# Patient Record
Sex: FEMALE | Race: WHITE | NOT HISPANIC OR LATINO | URBAN - METROPOLITAN AREA
[De-identification: names, ages, dates, MRNs, and addresses within clinical notes are randomized per-mention and may not be internally consistent; named-entity substitution may affect disease eponyms.]

---

## 2023-03-21 ENCOUNTER — NURSING HOME VISIT (OUTPATIENT)
Dept: POST ACUTE CARE | Facility: EXTERNAL LOCATION | Age: 73
End: 2023-03-21
Payer: MEDICARE

## 2023-03-21 DIAGNOSIS — F10.11 HISTORY OF ALCOHOL ABUSE: ICD-10-CM

## 2023-03-21 DIAGNOSIS — F03.C0 SEVERE DEMENTIA, UNSPECIFIED DEMENTIA TYPE, UNSPECIFIED WHETHER BEHAVIORAL, PSYCHOTIC, OR MOOD DISTURBANCE OR ANXIETY (MULTI): Primary | ICD-10-CM

## 2023-03-21 DIAGNOSIS — F41.9 ANXIETY AND DEPRESSION: ICD-10-CM

## 2023-03-21 DIAGNOSIS — F32.A ANXIETY AND DEPRESSION: ICD-10-CM

## 2023-03-21 DIAGNOSIS — C44.81 BASAL CELL CARCINOMA (BCC) OF OVERLAPPING SITES OF SKIN: ICD-10-CM

## 2023-03-21 PROCEDURE — 99497 ADVNCD CARE PLAN 30 MIN: CPT | Performed by: EMERGENCY MEDICINE

## 2023-03-21 PROCEDURE — 99342 HOME/RES VST NEW LOW MDM 30: CPT | Performed by: EMERGENCY MEDICINE

## 2023-03-21 NOTE — LETTER
Patient: Karolina Rice  : 1950    Encounter Date: 2023    Karolina Rice   72 y.o.  female  @location@            Assessment and Plan:  History and physical    Advanced dementia  History of alcohol abuse in the past  Anxiety and depression  Basal cell carcinoma of multiple sites   Allergic rhinitis     Currently her mental issues are controlled with Seroquel 12.5 mg orally every 8 hours as needed   Haldol 2 mg as needed   Takes melatonin 6 mg at bed time to help her sleep     Provide safe environment for the patient    Continue current medication regimen    Fall prevention    OT PT and speech therapy    Monitor and treat blood pressure    Skin care and aggressive decubitus ulcer prevention.    Bowel and bladder care    Optimal nutrition and supplementation    Monitor and treat blood glucose    GI  and DVT prophylaxis    Symptom control with as needed medications    Periodic lab work    Will follow    I spent greater than 16 minutes discussing advanced care planning including the explanation and discussion of advanced directives. If patient does not have current up to date documents, examples and information provided on how to create both living will and power of . Patient was encouraged to work on completing these documents.  Information and advise was also provided on DO NOT RESUSCITATE and patient encouraged to consider this  Patient is not sure about DNR at this time      source of history: Nurse, Medical personnel, Medical record, Patient.  History limitation: None.    HPI:  History and physical    Patient is unable to give any detailed history and therefore history is obtained from the chart  No acute complaints voiced by the patient or acute concerns raised by nursing  No current outpatient medications on file.     No current facility-administered medications for this visit.   Patient used to reside by herself in her home.  She was found wandering about couple of times  She was seen by  psychiatry evaluated to be recommended that she be placed in a memory care unit      Past history taken from chart includes  Basal cell carcinoma of the shoulder and skin of the forehead  Allergic rhinitis  Anxiety and depression      Past surgical history-  Excision of neuroma from digits of the toes  Hernia repair surgery  Ovarian cystectomy  Liposuction  Shoulder surgery    Social history-  Ex-smoker-15-pack-year history approximately  Used to drink 10 drinks a week of alcohol.  Unclear as to when she stopped drinking  No visits with results within 2 Month(s) from this visit.   Latest known visit with results is:   No results found for any previous visit.       Physical Exam:  Vital signs as per nursing/MA documentation were reviewed  General appearance: Alert and in no acute distress  HEENT: Normal Inspection  Neck - Normal Inspection  Respiratory : No respiratory distress. Lungs are clear   Cardiovascular: heart rate normal. No gallop  Back - normal inspection  Skin inspection:Warm  Musculoskeletal : No deformities  Neuro : Limited exam. Baseline    ROS: Review of symptoms is negative except for what is mentioned in HPI    No results found.    No family history on file.    Social History     Socioeconomic History   • Marital status:      Spouse name: Not on file   • Number of children: Not on file   • Years of education: Not on file   • Highest education level: Not on file   Occupational History   • Not on file   Tobacco Use   • Smoking status: Not on file   • Smokeless tobacco: Not on file   Vaping Use   • Vaping status: Not on file   Substance and Sexual Activity   • Alcohol use: Not on file   • Drug use: Not on file   • Sexual activity: Not on file   Other Topics Concern   • Not on file   Social History Narrative   • Not on file     Social Determinants of Health     Financial Resource Strain: Not on file   Food Insecurity: Not on file   Transportation Needs: Not on file   Physical Activity: Not on  file   Stress: Not on file   Social Connections: Not on file   Intimate Partner Violence: Not on file   Housing Stability: Not on file       Past Medical History:   Diagnosis Date   • Personal history of other malignant neoplasm of skin 01/10/2017    History of basal cell carcinoma       Past Surgical History:   Procedure Laterality Date   • COLONOSCOPY  01/10/2017    Complete Colonoscopy   • HERNIA REPAIR  01/10/2017    Hernia Repair   • OTHER SURGICAL HISTORY  01/10/2017    Excision Of Neuroma   • OTHER SURGICAL HISTORY  01/10/2017    Ovarian Cystectomy   • SHOULDER SURGERY  01/10/2017    Shoulder Surgery   • SKIN CANCER EXCISION  01/10/2017    Mohs Micrographic Surgery Face   • TOTAL KNEE ARTHROPLASTY  01/10/2017    Total Knee Arthroplasty         Charting was completed using voice recognition technology and may include unintended errors.    Discussed with patient/family, RN, and NP.      Electronically Signed By: Edmar Louise MD   4/4/23  4:43 PM

## 2023-04-04 PROBLEM — F41.9 ANXIETY AND DEPRESSION: Status: ACTIVE | Noted: 2023-04-04

## 2023-04-04 PROBLEM — C44.81 BASAL CELL CARCINOMA (BCC) OF OVERLAPPING SITES OF SKIN: Status: ACTIVE | Noted: 2023-04-04

## 2023-04-04 PROBLEM — F03.C0 SEVERE DEMENTIA (MULTI): Status: ACTIVE | Noted: 2023-04-04

## 2023-04-04 PROBLEM — F32.A ANXIETY AND DEPRESSION: Status: ACTIVE | Noted: 2023-04-04

## 2023-04-04 PROBLEM — F10.11 HISTORY OF ALCOHOL ABUSE: Status: ACTIVE | Noted: 2023-04-04

## 2023-04-04 NOTE — PROGRESS NOTES
Karolina Rice   72 y.o.  female  @location@            Assessment and Plan:  History and physical    Advanced dementia  History of alcohol abuse in the past  Anxiety and depression  Basal cell carcinoma of multiple sites   Allergic rhinitis     Currently her mental issues are controlled with Seroquel 12.5 mg orally every 8 hours as needed   Haldol 2 mg as needed   Takes melatonin 6 mg at bed time to help her sleep     Provide safe environment for the patient    Continue current medication regimen    Fall prevention    OT PT and speech therapy    Monitor and treat blood pressure    Skin care and aggressive decubitus ulcer prevention.    Bowel and bladder care    Optimal nutrition and supplementation    Monitor and treat blood glucose    GI  and DVT prophylaxis    Symptom control with as needed medications    Periodic lab work    Will follow    I spent greater than 16 minutes discussing advanced care planning including the explanation and discussion of advanced directives. If patient does not have current up to date documents, examples and information provided on how to create both living will and power of . Patient was encouraged to work on completing these documents.  Information and advise was also provided on DO NOT RESUSCITATE and patient encouraged to consider this  Patient is not sure about DNR at this time      source of history: Nurse, Medical personnel, Medical record, Patient.  History limitation: None.    HPI:  History and physical    Patient is unable to give any detailed history and therefore history is obtained from the chart  No acute complaints voiced by the patient or acute concerns raised by nursing  No current outpatient medications on file.     No current facility-administered medications for this visit.   Patient used to reside by herself in her home.  She was found wandering about couple of times  She was seen by psychiatry evaluated to be recommended that she be placed in a memory  care unit      Past history taken from chart includes  Basal cell carcinoma of the shoulder and skin of the forehead  Allergic rhinitis  Anxiety and depression      Past surgical history-  Excision of neuroma from digits of the toes  Hernia repair surgery  Ovarian cystectomy  Liposuction  Shoulder surgery    Social history-  Ex-smoker-15-pack-year history approximately  Used to drink 10 drinks a week of alcohol.  Unclear as to when she stopped drinking  No visits with results within 2 Month(s) from this visit.   Latest known visit with results is:   No results found for any previous visit.       Physical Exam:  Vital signs as per nursing/MA documentation were reviewed  General appearance: Alert and in no acute distress  HEENT: Normal Inspection  Neck - Normal Inspection  Respiratory : No respiratory distress. Lungs are clear   Cardiovascular: heart rate normal. No gallop  Back - normal inspection  Skin inspection:Warm  Musculoskeletal : No deformities  Neuro : Limited exam. Baseline    ROS: Review of symptoms is negative except for what is mentioned in HPI    No results found.    No family history on file.    Social History     Socioeconomic History    Marital status:      Spouse name: Not on file    Number of children: Not on file    Years of education: Not on file    Highest education level: Not on file   Occupational History    Not on file   Tobacco Use    Smoking status: Not on file    Smokeless tobacco: Not on file   Vaping Use    Vaping status: Not on file   Substance and Sexual Activity    Alcohol use: Not on file    Drug use: Not on file    Sexual activity: Not on file   Other Topics Concern    Not on file   Social History Narrative    Not on file     Social Determinants of Health     Financial Resource Strain: Not on file   Food Insecurity: Not on file   Transportation Needs: Not on file   Physical Activity: Not on file   Stress: Not on file   Social Connections: Not on file   Intimate Partner  Violence: Not on file   Housing Stability: Not on file       Past Medical History:   Diagnosis Date    Personal history of other malignant neoplasm of skin 01/10/2017    History of basal cell carcinoma       Past Surgical History:   Procedure Laterality Date    COLONOSCOPY  01/10/2017    Complete Colonoscopy    HERNIA REPAIR  01/10/2017    Hernia Repair    OTHER SURGICAL HISTORY  01/10/2017    Excision Of Neuroma    OTHER SURGICAL HISTORY  01/10/2017    Ovarian Cystectomy    SHOULDER SURGERY  01/10/2017    Shoulder Surgery    SKIN CANCER EXCISION  01/10/2017    Mohs Micrographic Surgery Face    TOTAL KNEE ARTHROPLASTY  01/10/2017    Total Knee Arthroplasty         Charting was completed using voice recognition technology and may include unintended errors.    Discussed with patient/family, RN, and NP.

## 2023-04-18 ENCOUNTER — NURSING HOME VISIT (OUTPATIENT)
Dept: POST ACUTE CARE | Facility: EXTERNAL LOCATION | Age: 73
End: 2023-04-18
Payer: MEDICARE

## 2023-04-18 DIAGNOSIS — F02.C0 SEVERE DEMENTIA ASSOCIATED WITH OTHER UNDERLYING DISEASE, UNSPECIFIED WHETHER BEHAVIORAL, PSYCHOTIC, OR MOOD DISTURBANCE OR ANXIETY (MULTI): Primary | ICD-10-CM

## 2023-04-18 DIAGNOSIS — C44.81 BASAL CELL CARCINOMA (BCC) OF OVERLAPPING SITES OF SKIN: ICD-10-CM

## 2023-04-18 DIAGNOSIS — F32.4 MAJOR DEPRESSIVE DISORDER IN PARTIAL REMISSION, UNSPECIFIED WHETHER RECURRENT (CMS-HCC): ICD-10-CM

## 2023-04-18 DIAGNOSIS — F03.918 AGGRESSIVE BEHAVIOR DUE TO DEMENTIA (MULTI): ICD-10-CM

## 2023-04-18 PROCEDURE — 99349 HOME/RES VST EST MOD MDM 40: CPT | Performed by: EMERGENCY MEDICINE

## 2023-04-18 NOTE — LETTER
Patient: Karolina Rice  : 1950    Encounter Date: 2023    Karolina Rice   72 y.o.  female  @location@            Assessment and Plan:  Advanced dementia  History of alcohol abuse in the past  Anxiety and depression  Basal cell carcinoma of multiple sites   Allergic rhinitis     Currently her mental issues are controlled with Seroquel 12.5 mg orally every 8 hours as needed   Haldol 2 mg as needed   Takes melatonin 6 mg at bed time to help her sleep     Provide safe environment for the patient    Continue current medication regimen    Fall prevention    OT PT and speech therapy    Monitor and treat blood pressure    Skin care and aggressive decubitus ulcer prevention.    Bowel and bladder care    Optimal nutrition and supplementation    Monitor and treat blood glucose    GI  and DVT prophylaxis    Symptom control with as needed medications    Periodic lab work    Will follow    -Fall prevention    -Cognitive engagement     -Monitor and treat blood pressure     -Aggressive decubitus ulcer prevention.     -Bowel and bladder care     -Optimal nutrition and supplementation as needed     -GI  and DVT prophylaxis     -Symptom control     -Ambulation as tolerated     -Will follow    source of history: Nurse, Medical personnel, Medical record, Patient.  History limitation: None.    HPI:    Patient is unable to give any detailed history and therefore history is obtained from the chart  No acute complaints voiced by the patient or acute concerns raised by nursing  No current outpatient medications on file.     No current facility-administered medications for this visit.   Patient used to reside by herself in her home.  She was found wandering about couple of times  She was seen by psychiatry evaluated to be recommended that she be placed in a memory care unit      Past history taken from chart includes  Basal cell carcinoma of the shoulder and skin of the forehead  Allergic rhinitis  Anxiety and  depression      Past surgical history-  Excision of neuroma from digits of the toes  Hernia repair surgery  Ovarian cystectomy  Liposuction  Shoulder surgery    Social history-  Ex-smoker-15-pack-year history approximately  Used to drink 10 drinks a week of alcohol.  Unclear as to when she stopped drinking  No visits with results within 2 Month(s) from this visit.   Latest known visit with results is:   No results found for any previous visit.       Physical Exam:  Vital signs as per nursing/MA documentation were reviewed  General appearance: Alert and in no acute distress  HEENT: Normal Inspection  Neck - Normal Inspection  Respiratory : No respiratory distress. Lungs are clear   Cardiovascular: heart rate normal. No gallop  Back - normal inspection  Skin inspection:Warm  Musculoskeletal : No deformities  Neuro : Limited exam. Baseline    ROS: Review of symptoms is negative except for what is mentioned in HPI    No results found.    No family history on file.    Social History     Socioeconomic History   • Marital status:      Spouse name: Not on file   • Number of children: Not on file   • Years of education: Not on file   • Highest education level: Not on file   Occupational History   • Not on file   Tobacco Use   • Smoking status: Not on file   • Smokeless tobacco: Not on file   Vaping Use   • Vaping status: Not on file   Substance and Sexual Activity   • Alcohol use: Not on file   • Drug use: Not on file   • Sexual activity: Not on file   Other Topics Concern   • Not on file   Social History Narrative   • Not on file     Social Determinants of Health     Financial Resource Strain: Not on file   Food Insecurity: Not on file   Transportation Needs: Not on file   Physical Activity: Not on file   Stress: Not on file   Social Connections: Not on file   Intimate Partner Violence: Not on file   Housing Stability: Not on file       Past Medical History:   Diagnosis Date   • Personal history of other malignant  neoplasm of skin 01/10/2017    History of basal cell carcinoma       Past Surgical History:   Procedure Laterality Date   • COLONOSCOPY  01/10/2017    Complete Colonoscopy   • HERNIA REPAIR  01/10/2017    Hernia Repair   • OTHER SURGICAL HISTORY  01/10/2017    Excision Of Neuroma   • OTHER SURGICAL HISTORY  01/10/2017    Ovarian Cystectomy   • SHOULDER SURGERY  01/10/2017    Shoulder Surgery   • SKIN CANCER EXCISION  01/10/2017    Mohs Micrographic Surgery Face   • TOTAL KNEE ARTHROPLASTY  01/10/2017    Total Knee Arthroplasty         Charting was completed using voice recognition technology and may include unintended errors.    Discussed with patient/family, RN, and NP.      Electronically Signed By: Edmar Louise MD   4/22/23  8:02 AM

## 2023-04-22 PROBLEM — F03.918 AGGRESSIVE BEHAVIOR DUE TO DEMENTIA (MULTI): Status: ACTIVE | Noted: 2023-04-22

## 2023-04-22 PROBLEM — F32.4 MAJOR DEPRESSIVE DISORDER IN PARTIAL REMISSION (CMS-HCC): Status: ACTIVE | Noted: 2023-04-22

## 2023-04-22 NOTE — PROGRESS NOTES
Karolina Rice   72 y.o.  female  @location@            Assessment and Plan:  Advanced dementia  History of alcohol abuse in the past  Anxiety and depression  Basal cell carcinoma of multiple sites   Allergic rhinitis     Currently her mental issues are controlled with Seroquel 12.5 mg orally every 8 hours as needed   Haldol 2 mg as needed   Takes melatonin 6 mg at bed time to help her sleep     Provide safe environment for the patient    Continue current medication regimen    Fall prevention    OT PT and speech therapy    Monitor and treat blood pressure    Skin care and aggressive decubitus ulcer prevention.    Bowel and bladder care    Optimal nutrition and supplementation    Monitor and treat blood glucose    GI  and DVT prophylaxis    Symptom control with as needed medications    Periodic lab work    Will follow    -Fall prevention    -Cognitive engagement     -Monitor and treat blood pressure     -Aggressive decubitus ulcer prevention.     -Bowel and bladder care     -Optimal nutrition and supplementation as needed     -GI  and DVT prophylaxis     -Symptom control     -Ambulation as tolerated     -Will follow    source of history: Nurse, Medical personnel, Medical record, Patient.  History limitation: None.    HPI:    Patient is unable to give any detailed history and therefore history is obtained from the chart  No acute complaints voiced by the patient or acute concerns raised by nursing  No current outpatient medications on file.     No current facility-administered medications for this visit.   Patient used to reside by herself in her home.  She was found wandering about couple of times  She was seen by psychiatry evaluated to be recommended that she be placed in a memory care unit      Past history taken from chart includes  Basal cell carcinoma of the shoulder and skin of the forehead  Allergic rhinitis  Anxiety and depression      Past surgical history-  Excision of neuroma from digits of the  toes  Hernia repair surgery  Ovarian cystectomy  Liposuction  Shoulder surgery    Social history-  Ex-smoker-15-pack-year history approximately  Used to drink 10 drinks a week of alcohol.  Unclear as to when she stopped drinking  No visits with results within 2 Month(s) from this visit.   Latest known visit with results is:   No results found for any previous visit.       Physical Exam:  Vital signs as per nursing/MA documentation were reviewed  General appearance: Alert and in no acute distress  HEENT: Normal Inspection  Neck - Normal Inspection  Respiratory : No respiratory distress. Lungs are clear   Cardiovascular: heart rate normal. No gallop  Back - normal inspection  Skin inspection:Warm  Musculoskeletal : No deformities  Neuro : Limited exam. Baseline    ROS: Review of symptoms is negative except for what is mentioned in HPI    No results found.    No family history on file.    Social History     Socioeconomic History    Marital status:      Spouse name: Not on file    Number of children: Not on file    Years of education: Not on file    Highest education level: Not on file   Occupational History    Not on file   Tobacco Use    Smoking status: Not on file    Smokeless tobacco: Not on file   Vaping Use    Vaping status: Not on file   Substance and Sexual Activity    Alcohol use: Not on file    Drug use: Not on file    Sexual activity: Not on file   Other Topics Concern    Not on file   Social History Narrative    Not on file     Social Determinants of Health     Financial Resource Strain: Not on file   Food Insecurity: Not on file   Transportation Needs: Not on file   Physical Activity: Not on file   Stress: Not on file   Social Connections: Not on file   Intimate Partner Violence: Not on file   Housing Stability: Not on file       Past Medical History:   Diagnosis Date    Personal history of other malignant neoplasm of skin 01/10/2017    History of basal cell carcinoma       Past Surgical History:    Procedure Laterality Date    COLONOSCOPY  01/10/2017    Complete Colonoscopy    HERNIA REPAIR  01/10/2017    Hernia Repair    OTHER SURGICAL HISTORY  01/10/2017    Excision Of Neuroma    OTHER SURGICAL HISTORY  01/10/2017    Ovarian Cystectomy    SHOULDER SURGERY  01/10/2017    Shoulder Surgery    SKIN CANCER EXCISION  01/10/2017    Mohs Micrographic Surgery Face    TOTAL KNEE ARTHROPLASTY  01/10/2017    Total Knee Arthroplasty         Charting was completed using voice recognition technology and may include unintended errors.    Discussed with patient/family, RN, and NP.

## 2023-05-09 ENCOUNTER — NURSING HOME VISIT (OUTPATIENT)
Dept: POST ACUTE CARE | Facility: EXTERNAL LOCATION | Age: 73
End: 2023-05-09
Payer: MEDICARE

## 2023-05-09 DIAGNOSIS — F03.918 AGGRESSIVE BEHAVIOR DUE TO DEMENTIA (MULTI): Primary | ICD-10-CM

## 2023-05-09 DIAGNOSIS — F32.4 MAJOR DEPRESSIVE DISORDER IN PARTIAL REMISSION, UNSPECIFIED WHETHER RECURRENT (CMS-HCC): ICD-10-CM

## 2023-05-09 DIAGNOSIS — C44.81 BASAL CELL CARCINOMA (BCC) OF OVERLAPPING SITES OF SKIN: ICD-10-CM

## 2023-05-09 DIAGNOSIS — F02.C0 SEVERE DEMENTIA ASSOCIATED WITH OTHER UNDERLYING DISEASE, UNSPECIFIED WHETHER BEHAVIORAL, PSYCHOTIC, OR MOOD DISTURBANCE OR ANXIETY (MULTI): ICD-10-CM

## 2023-05-09 PROCEDURE — 99349 HOME/RES VST EST MOD MDM 40: CPT | Performed by: EMERGENCY MEDICINE

## 2023-05-09 NOTE — LETTER
Patient: Karolina Rice  : 1950    Encounter Date: 2023    Karolina Rice   72 y.o.  female  @location@            Assessment and Plan:  Advanced dementia  History of alcohol abuse in the past  Anxiety and depression  Basal cell carcinoma of multiple sites   Allergic rhinitis     Currently her mental issues are controlled with Seroquel 12.5 mg orally every 8 hours as needed   Haldol 2 mg as needed   Takes melatonin 6 mg at bed time to help her sleep     Provide safe environment for the patient    Continue current medication regimen    Fall prevention    OT PT and speech therapy    Monitor and treat blood pressure    Skin care and aggressive decubitus ulcer prevention.    Bowel and bladder care    Optimal nutrition and supplementation    Monitor and treat blood glucose    GI  and DVT prophylaxis    Symptom control with as needed medications    Periodic lab work    Will follow    1. medications are reviewed      2. Continue with rehabilitative, supportive, and or restorative care as ordered and as the patient tolerates     3. Laboratory evaluations will be monitored on an ongoing as needed basis     4. Medications have been cross-referenced with the patient's diagnoses list, and medications reductions have been considered and/or implemented.     5. Pharmacy recommendations are addressed on an ongoing as needed basis.     6. Controlled substances have been electronically scripted every 60 days for opiates and others of similar schedule, and every 6 months for sedative/hypnotics and others of similar schedule.     7. Nursing has been queried about any potential adverse events that need to be reported to me.    Salient information and adjustment of care plan pertaining to this individual patient interaction today are the following:      A. We will continue with restorative and supportive care as the patient tolerates    B. Laboratory examinations will continue to be drawn on an ongoing as-needed basis. The  patient's weight needs to be monitored and if needed we may need to institute appetite stimulating medication    C. The patient's long term prognosis is guarded    Charting is done using voice recognition software and may contain errors which may not have been completely corrected      source of history: Nurse, Medical personnel, Medical record, Patient.  History limitation: None.    HPI:    Patient is unable to give any detailed history and therefore history is obtained from the chart  No acute complaints voiced by the patient or acute concerns raised by nursing  No current outpatient medications on file.     No current facility-administered medications for this visit.   Patient used to reside by herself in her home.  She was found wandering about couple of times  She was seen by psychiatry evaluated to be recommended that she be placed in a memory care unit      Past history taken from chart includes  Basal cell carcinoma of the shoulder and skin of the forehead  Allergic rhinitis  Anxiety and depression      Past surgical history-  Excision of neuroma from digits of the toes  Hernia repair surgery  Ovarian cystectomy  Liposuction  Shoulder surgery    Social history-  Ex-smoker-15-pack-year history approximately  Used to drink 10 drinks a week of alcohol.  Unclear as to when she stopped drinking  No visits with results within 2 Month(s) from this visit.   Latest known visit with results is:   No results found for any previous visit.       Physical Exam:  Vital signs as per nursing/MA documentation were reviewed  General appearance: Alert and in no acute distress  HEENT: Normal Inspection  Neck - Normal Inspection  Respiratory : No respiratory distress. Lungs are clear   Cardiovascular: heart rate normal. No gallop  Back - normal inspection  Skin inspection:Warm  Musculoskeletal : No deformities  Neuro : Limited exam. Baseline    ROS: Review of symptoms is negative except for what is mentioned in HPI    No results  found.    No family history on file.    Social History     Socioeconomic History   • Marital status:      Spouse name: Not on file   • Number of children: Not on file   • Years of education: Not on file   • Highest education level: Not on file   Occupational History   • Not on file   Tobacco Use   • Smoking status: Not on file   • Smokeless tobacco: Not on file   Vaping Use   • Vaping status: Not on file   Substance and Sexual Activity   • Alcohol use: Not on file   • Drug use: Not on file   • Sexual activity: Not on file   Other Topics Concern   • Not on file   Social History Narrative   • Not on file     Social Determinants of Health     Financial Resource Strain: Not on file   Food Insecurity: Not on file   Transportation Needs: Not on file   Physical Activity: Not on file   Stress: Not on file   Social Connections: Not on file   Intimate Partner Violence: Not on file   Housing Stability: Not on file       Past Medical History:   Diagnosis Date   • Personal history of other malignant neoplasm of skin 01/10/2017    History of basal cell carcinoma       Past Surgical History:   Procedure Laterality Date   • COLONOSCOPY  01/10/2017    Complete Colonoscopy   • HERNIA REPAIR  01/10/2017    Hernia Repair   • OTHER SURGICAL HISTORY  01/10/2017    Excision Of Neuroma   • OTHER SURGICAL HISTORY  01/10/2017    Ovarian Cystectomy   • SHOULDER SURGERY  01/10/2017    Shoulder Surgery   • SKIN CANCER EXCISION  01/10/2017    Mohs Micrographic Surgery Face   • TOTAL KNEE ARTHROPLASTY  01/10/2017    Total Knee Arthroplasty         Charting was completed using voice recognition technology and may include unintended errors.    Discussed with patient/family, RN, and NP.      Electronically Signed By: Edmar Louise MD   5/13/23  9:14 AM

## 2023-05-13 NOTE — PROGRESS NOTES
Karolina Rice   72 y.o.  female  @location@            Assessment and Plan:  Advanced dementia  History of alcohol abuse in the past  Anxiety and depression  Basal cell carcinoma of multiple sites   Allergic rhinitis     Currently her mental issues are controlled with Seroquel 12.5 mg orally every 8 hours as needed   Haldol 2 mg as needed   Takes melatonin 6 mg at bed time to help her sleep     Provide safe environment for the patient    Continue current medication regimen    Fall prevention    OT PT and speech therapy    Monitor and treat blood pressure    Skin care and aggressive decubitus ulcer prevention.    Bowel and bladder care    Optimal nutrition and supplementation    Monitor and treat blood glucose    GI  and DVT prophylaxis    Symptom control with as needed medications    Periodic lab work    Will follow    1. medications are reviewed      2. Continue with rehabilitative, supportive, and or restorative care as ordered and as the patient tolerates     3. Laboratory evaluations will be monitored on an ongoing as needed basis     4. Medications have been cross-referenced with the patient's diagnoses list, and medications reductions have been considered and/or implemented.     5. Pharmacy recommendations are addressed on an ongoing as needed basis.     6. Controlled substances have been electronically scripted every 60 days for opiates and others of similar schedule, and every 6 months for sedative/hypnotics and others of similar schedule.     7. Nursing has been queried about any potential adverse events that need to be reported to me.    Salient information and adjustment of care plan pertaining to this individual patient interaction today are the following:      A. We will continue with restorative and supportive care as the patient tolerates    B. Laboratory examinations will continue to be drawn on an ongoing as-needed basis. The patient's weight needs to be monitored and if needed we may need to  institute appetite stimulating medication    C. The patient's long term prognosis is guarded    Charting is done using voice recognition software and may contain errors which may not have been completely corrected      source of history: Nurse, Medical personnel, Medical record, Patient.  History limitation: None.    HPI:    Patient is unable to give any detailed history and therefore history is obtained from the chart  No acute complaints voiced by the patient or acute concerns raised by nursing  No current outpatient medications on file.     No current facility-administered medications for this visit.   Patient used to reside by herself in her home.  She was found wandering about couple of times  She was seen by psychiatry evaluated to be recommended that she be placed in a memory care unit      Past history taken from chart includes  Basal cell carcinoma of the shoulder and skin of the forehead  Allergic rhinitis  Anxiety and depression      Past surgical history-  Excision of neuroma from digits of the toes  Hernia repair surgery  Ovarian cystectomy  Liposuction  Shoulder surgery    Social history-  Ex-smoker-15-pack-year history approximately  Used to drink 10 drinks a week of alcohol.  Unclear as to when she stopped drinking  No visits with results within 2 Month(s) from this visit.   Latest known visit with results is:   No results found for any previous visit.       Physical Exam:  Vital signs as per nursing/MA documentation were reviewed  General appearance: Alert and in no acute distress  HEENT: Normal Inspection  Neck - Normal Inspection  Respiratory : No respiratory distress. Lungs are clear   Cardiovascular: heart rate normal. No gallop  Back - normal inspection  Skin inspection:Warm  Musculoskeletal : No deformities  Neuro : Limited exam. Baseline    ROS: Review of symptoms is negative except for what is mentioned in HPI    No results found.    No family history on file.    Social History      Socioeconomic History    Marital status:      Spouse name: Not on file    Number of children: Not on file    Years of education: Not on file    Highest education level: Not on file   Occupational History    Not on file   Tobacco Use    Smoking status: Not on file    Smokeless tobacco: Not on file   Vaping Use    Vaping status: Not on file   Substance and Sexual Activity    Alcohol use: Not on file    Drug use: Not on file    Sexual activity: Not on file   Other Topics Concern    Not on file   Social History Narrative    Not on file     Social Determinants of Health     Financial Resource Strain: Not on file   Food Insecurity: Not on file   Transportation Needs: Not on file   Physical Activity: Not on file   Stress: Not on file   Social Connections: Not on file   Intimate Partner Violence: Not on file   Housing Stability: Not on file       Past Medical History:   Diagnosis Date    Personal history of other malignant neoplasm of skin 01/10/2017    History of basal cell carcinoma       Past Surgical History:   Procedure Laterality Date    COLONOSCOPY  01/10/2017    Complete Colonoscopy    HERNIA REPAIR  01/10/2017    Hernia Repair    OTHER SURGICAL HISTORY  01/10/2017    Excision Of Neuroma    OTHER SURGICAL HISTORY  01/10/2017    Ovarian Cystectomy    SHOULDER SURGERY  01/10/2017    Shoulder Surgery    SKIN CANCER EXCISION  01/10/2017    Mohs Micrographic Surgery Face    TOTAL KNEE ARTHROPLASTY  01/10/2017    Total Knee Arthroplasty         Charting was completed using voice recognition technology and may include unintended errors.    Discussed with patient/family, RN, and NP.

## 2023-06-22 ENCOUNTER — HOME VISIT (OUTPATIENT)
Dept: POST ACUTE CARE | Facility: EXTERNAL LOCATION | Age: 73
End: 2023-06-22
Payer: MEDICARE

## 2023-06-22 DIAGNOSIS — C44.81 BASAL CELL CARCINOMA (BCC) OF OVERLAPPING SITES OF SKIN: Primary | ICD-10-CM

## 2023-06-22 DIAGNOSIS — F03.918 AGGRESSIVE BEHAVIOR DUE TO DEMENTIA (MULTI): ICD-10-CM

## 2023-06-22 DIAGNOSIS — F32.4 MAJOR DEPRESSIVE DISORDER IN PARTIAL REMISSION, UNSPECIFIED WHETHER RECURRENT (CMS-HCC): ICD-10-CM

## 2023-06-22 DIAGNOSIS — F10.11 HISTORY OF ALCOHOL ABUSE: ICD-10-CM

## 2023-06-22 PROCEDURE — 99348 HOME/RES VST EST LOW MDM 30: CPT | Performed by: EMERGENCY MEDICINE

## 2023-07-03 NOTE — PROGRESS NOTES
Karolina Rice   72 y.o.  female  @location@            Assessment and Plan:  Advanced dementia  History of alcohol abuse in the past  Anxiety and depression  Basal cell carcinoma of multiple sites   Allergic rhinitis     Currently her mental issues are controlled with Seroquel 12.5 mg orally every 8 hours as needed   Haldol 2 mg as needed   Takes melatonin 6 mg at bed time to help her sleep     -Fall prevention    -Cognitive engagement     -Monitor and treat blood pressure     -Aggressive decubitus ulcer prevention.     -Bowel and bladder care     -Optimal nutrition and supplementation as needed     -GI  and DVT prophylaxis     -Symptom control     -Ambulation as tolerated     -Will follow    Charting is done using voice recognition software and may contain errors which have not been completely corrected      source of history: Nurse, Medical personnel, Medical record, Patient.  History limitation: None.    HPI:    Patient is unable to give any detailed history and therefore history is obtained from the chart  No acute complaints voiced by the patient or acute concerns raised by nursing    Patient used to reside by herself in her home.  She was found wandering about couple of times  She was seen by psychiatry evaluated to be recommended that she be placed in a memory care unit      Past history taken from chart includes  Basal cell carcinoma of the shoulder and skin of the forehead  Allergic rhinitis  Anxiety and depression      Past surgical history-  Excision of neuroma from digits of the toes  Hernia repair surgery  Ovarian cystectomy  Liposuction  Shoulder surgery    Social history-  Ex-smoker-15-pack-year history approximately  Used to drink 10 drinks a week of alcohol.  Unclear as to when she stopped drinking      Physical Exam:  Vital signs as per nursing/MA documentation were reviewed  General appearance: Alert and in no acute distress  HEENT: Normal Inspection  Neck - Normal Inspection  Respiratory : No  respiratory distress. Lungs are clear   Cardiovascular: heart rate normal. No gallop  Back - normal inspection  Skin inspection:Warm  Musculoskeletal : No deformities  Neuro : Limited exam. Baseline    ROS: Review of symptoms is negative except for what is mentioned in HPI          Past Medical History:   Diagnosis Date    Personal history of other malignant neoplasm of skin 01/10/2017    History of basal cell carcinoma       Past Surgical History:   Procedure Laterality Date    COLONOSCOPY  01/10/2017    Complete Colonoscopy    HERNIA REPAIR  01/10/2017    Hernia Repair    OTHER SURGICAL HISTORY  01/10/2017    Excision Of Neuroma    OTHER SURGICAL HISTORY  01/10/2017    Ovarian Cystectomy    SHOULDER SURGERY  01/10/2017    Shoulder Surgery    SKIN CANCER EXCISION  01/10/2017    Mohs Micrographic Surgery Face    TOTAL KNEE ARTHROPLASTY  01/10/2017    Total Knee Arthroplasty         Charting was completed using voice recognition technology and may include unintended errors.    Discussed with patient/family, RN, and NP.

## 2023-07-18 ENCOUNTER — HOME VISIT (OUTPATIENT)
Dept: POST ACUTE CARE | Facility: EXTERNAL LOCATION | Age: 73
End: 2023-07-18
Payer: MEDICARE

## 2023-07-18 DIAGNOSIS — F32.4 MAJOR DEPRESSIVE DISORDER IN PARTIAL REMISSION, UNSPECIFIED WHETHER RECURRENT (CMS-HCC): ICD-10-CM

## 2023-07-18 DIAGNOSIS — C44.81 BASAL CELL CARCINOMA (BCC) OF OVERLAPPING SITES OF SKIN: ICD-10-CM

## 2023-07-18 DIAGNOSIS — F32.A ANXIETY AND DEPRESSION: Primary | ICD-10-CM

## 2023-07-18 DIAGNOSIS — F02.C0 SEVERE DEMENTIA ASSOCIATED WITH OTHER UNDERLYING DISEASE, UNSPECIFIED WHETHER BEHAVIORAL, PSYCHOTIC, OR MOOD DISTURBANCE OR ANXIETY (MULTI): ICD-10-CM

## 2023-07-18 DIAGNOSIS — F41.9 ANXIETY AND DEPRESSION: Primary | ICD-10-CM

## 2023-07-18 PROCEDURE — 99349 HOME/RES VST EST MOD MDM 40: CPT | Performed by: EMERGENCY MEDICINE

## 2023-07-25 NOTE — PROGRESS NOTES
Karolina Rice   72 y.o.  female  @location@            Assessment and Plan:  Advanced dementia  History of alcohol abuse in the past  Anxiety and depression  Basal cell carcinoma of multiple sites   Allergic rhinitis     Currently her mental issues are controlled with Seroquel 12.5 mg orally every 8 hours as needed   Haldol 2 mg as needed   Takes melatonin 6 mg at bed time to help her sleep     Provide safe environment for the patient     Continue current medication regimen     OT PT and speech therapy     Bowel and bladder,skin care     Nutritional support     monitor and treat blood glucose     GI  and DVT prophylaxis     PRN medications     Periodic lab work    Regular Follow up    Charting is done using voice recognition software and may contain errors which have not been completely corrected      source of history: Nurse, Medical personnel, Medical record, Patient.  History limitation: None.    HPI:    Patient is unable to give any detailed history and therefore history is obtained from the chart  No acute complaints voiced by the patient or acute concerns raised by nursing    Patient used to reside by herself in her home.  She was found wandering about couple of times  She was seen by psychiatry evaluated to be recommended that she be placed in a memory care unit      Past history taken from chart includes  Basal cell carcinoma of the shoulder and skin of the forehead  Allergic rhinitis  Anxiety and depression      Past surgical history-  Excision of neuroma from digits of the toes  Hernia repair surgery  Ovarian cystectomy  Liposuction  Shoulder surgery    Social history-  Ex-smoker-15-pack-year history approximately  Used to drink 10 drinks a week of alcohol.  Unclear as to when she stopped drinking      Physical Exam:  Vital signs as per nursing/MA documentation were reviewed  General appearance: Alert and in no acute distress  HEENT: Normal Inspection  Neck - Normal Inspection  Respiratory : No  respiratory distress. Lungs are clear   Cardiovascular: heart rate normal. No gallop  Back - normal inspection  Skin inspection:Warm  Musculoskeletal : No deformities  Neuro : Limited exam. Baseline    ROS: Review of symptoms is negative except for what is mentioned in HPI          Past Medical History:   Diagnosis Date    Personal history of other malignant neoplasm of skin 01/10/2017    History of basal cell carcinoma       Past Surgical History:   Procedure Laterality Date    COLONOSCOPY  01/10/2017    Complete Colonoscopy    HERNIA REPAIR  01/10/2017    Hernia Repair    OTHER SURGICAL HISTORY  01/10/2017    Excision Of Neuroma    OTHER SURGICAL HISTORY  01/10/2017    Ovarian Cystectomy    SHOULDER SURGERY  01/10/2017    Shoulder Surgery    SKIN CANCER EXCISION  01/10/2017    Mohs Micrographic Surgery Face    TOTAL KNEE ARTHROPLASTY  01/10/2017    Total Knee Arthroplasty         Charting was completed using voice recognition technology and may include unintended errors.    Discussed with patient/family, RN, and NP.

## 2023-08-15 ENCOUNTER — HOME VISIT (OUTPATIENT)
Dept: POST ACUTE CARE | Facility: EXTERNAL LOCATION | Age: 73
End: 2023-08-15
Payer: MEDICARE

## 2023-08-15 DIAGNOSIS — C44.81 BASAL CELL CARCINOMA (BCC) OF OVERLAPPING SITES OF SKIN: ICD-10-CM

## 2023-08-15 DIAGNOSIS — F41.9 ANXIETY AND DEPRESSION: Primary | ICD-10-CM

## 2023-08-15 DIAGNOSIS — F32.A ANXIETY AND DEPRESSION: Primary | ICD-10-CM

## 2023-08-15 DIAGNOSIS — F02.C0 SEVERE DEMENTIA ASSOCIATED WITH OTHER UNDERLYING DISEASE, UNSPECIFIED WHETHER BEHAVIORAL, PSYCHOTIC, OR MOOD DISTURBANCE OR ANXIETY (MULTI): ICD-10-CM

## 2023-08-15 DIAGNOSIS — F32.4 MAJOR DEPRESSIVE DISORDER IN PARTIAL REMISSION, UNSPECIFIED WHETHER RECURRENT (CMS-HCC): ICD-10-CM

## 2023-08-15 PROCEDURE — 99348 HOME/RES VST EST LOW MDM 30: CPT | Performed by: EMERGENCY MEDICINE

## 2023-08-19 NOTE — PROGRESS NOTES
Karolina Rice   72 y.o.  female  @location@            Assessment and Plan:  Advanced dementia  History of alcohol abuse in the past  Anxiety and depression  Basal cell carcinoma of multiple sites   Allergic rhinitis     Currently her mental issues are controlled with Seroquel 12.5 mg orally every 8 hours as needed   Haldol 2 mg as needed   Takes melatonin 6 mg at bed time to help her sleep     Rx list reviewed.   PT and OT evaluation is in the process.   Routine safety measures, fall precautions, risk modification and alarm placement if needed for prevention of falls.   Skin care precautions, prevention of pressures sores at pressure points assessed.   Pt needs to be monitored frequently by nursing staff particularly at night time.   Any confusion, agitation or behavioural disturbance needs to be attended, as per home policy   rapid covid Ag assay need to be done, notify if positive.   If needed appropriate measures to be taken for alarm placements and assisted devices, pt was told not to get up and ambulate at night unless help and assist available at bedside,   labs will be done as per our routine protocol.   PO intake need to be monitored if consuming po.       Charting is done using voice recognition software and may contain errors which have not been completely corrected        source of history: Nurse, Medical personnel, Medical record, Patient.  History limitation: None.    HPI:    Patient is unable to give any detailed history and therefore history is obtained from the chart  No acute complaints voiced by the patient or acute concerns raised by nursing    Patient used to reside by herself in her home.  She was found wandering about couple of times  She was seen by psychiatry evaluated to be recommended that she be placed in a memory care unit      Past history taken from chart includes  Basal cell carcinoma of the shoulder and skin of the forehead  Allergic rhinitis  Anxiety and depression      Past  surgical history-  Excision of neuroma from digits of the toes  Hernia repair surgery  Ovarian cystectomy  Liposuction  Shoulder surgery    Social history-  Ex-smoker-15-pack-year history approximately  Used to drink 10 drinks a week of alcohol.  Unclear as to when she stopped drinking      Physical Exam:  Vital signs as per nursing/MA documentation were reviewed  General appearance: Alert and in no acute distress  HEENT: Normal Inspection  Neck - Normal Inspection  Respiratory : No respiratory distress. Lungs are clear   Cardiovascular: heart rate normal. No gallop  Back - normal inspection  Skin inspection:Warm  Musculoskeletal : No deformities  Neuro : Limited exam. Baseline    ROS: Review of symptoms is negative except for what is mentioned in HPI          Past Medical History:   Diagnosis Date    Personal history of other malignant neoplasm of skin 01/10/2017    History of basal cell carcinoma       Past Surgical History:   Procedure Laterality Date    COLONOSCOPY  01/10/2017    Complete Colonoscopy    HERNIA REPAIR  01/10/2017    Hernia Repair    OTHER SURGICAL HISTORY  01/10/2017    Excision Of Neuroma    OTHER SURGICAL HISTORY  01/10/2017    Ovarian Cystectomy    SHOULDER SURGERY  01/10/2017    Shoulder Surgery    SKIN CANCER EXCISION  01/10/2017    Mohs Micrographic Surgery Face    TOTAL KNEE ARTHROPLASTY  01/10/2017    Total Knee Arthroplasty         Charting was completed using voice recognition technology and may include unintended errors.    Discussed with patient/family, RN, and NP.

## 2023-09-14 ENCOUNTER — HOME VISIT (OUTPATIENT)
Dept: POST ACUTE CARE | Facility: EXTERNAL LOCATION | Age: 73
End: 2023-09-14
Payer: MEDICARE

## 2023-09-14 DIAGNOSIS — Z00.00 WELLNESS EXAMINATION: Primary | ICD-10-CM

## 2023-09-14 DIAGNOSIS — F32.4 MAJOR DEPRESSIVE DISORDER IN PARTIAL REMISSION, UNSPECIFIED WHETHER RECURRENT (CMS-HCC): ICD-10-CM

## 2023-09-14 DIAGNOSIS — F02.C0 SEVERE DEMENTIA ASSOCIATED WITH OTHER UNDERLYING DISEASE, UNSPECIFIED WHETHER BEHAVIORAL, PSYCHOTIC, OR MOOD DISTURBANCE OR ANXIETY (MULTI): ICD-10-CM

## 2023-09-14 DIAGNOSIS — C44.81 BASAL CELL CARCINOMA (BCC) OF OVERLAPPING SITES OF SKIN: ICD-10-CM

## 2023-09-14 DIAGNOSIS — F03.918 AGGRESSIVE BEHAVIOR DUE TO DEMENTIA (MULTI): ICD-10-CM

## 2023-09-14 PROCEDURE — 99348 HOME/RES VST EST LOW MDM 30: CPT | Performed by: EMERGENCY MEDICINE

## 2023-09-14 PROCEDURE — 99497 ADVNCD CARE PLAN 30 MIN: CPT | Performed by: EMERGENCY MEDICINE

## 2023-09-14 PROCEDURE — G0439 PPPS, SUBSEQ VISIT: HCPCS | Performed by: EMERGENCY MEDICINE

## 2023-09-19 NOTE — PROGRESS NOTES
Karolina Rice   73 y.o.  female  @location@    Annual Medicare wellness and physical    Past Medical, Surgical and Family History: reviewed and updated in chart.   Medications and Supplements: Review of all medications by a prescribing practitioner or clinical pharmacist (such as prescriptions, OTCs, herbal therapies and supplements) documented in the medical record.    No, the patient is not using opioids.   Tobacco use: Non-User The patient has never smoked cigarettes.   Alcohol use: Non-User   Illicit drug use: Non-User   Current diet: well balanced diet.   Exercise Frequency: the patient does not exercise.   Depression/Suicide Screening: Patient has a current diagnosis of depression.    Hearing Impairment: none.   Cognitive Impairment: No cognitive impairment observed.     Bathing: needs assistance.   Dressing: needs assistance.   Walking: needs assistance.   Toileting: needs assistance.   Feeding: needs assistance.   Personal Hygiene: needs assistance.   Managing Finances: needs assistance.   Shopping: needs assistance.   Managing Medications: needs assistance.   Housework / Basic Home Maintenance: needs assistance.   Handling Transportation: needs assistance.   Preparing Meals: needs assistance.   Using the Telephone/ Communication Devices: needs assistance.    Falls Risk Screening:. Patient has not fallen in the last 6 months.   Home safety risk factors: none.   Advance directives:. Advanced Care Planning discussed and documented advance care plan or surrogate decision maker documented in the medical record.   A Conversation about Advance directives of at least 16 minutes has occurred. Actual time spent: 20   Topics discussed: Code status per nursing documentation.   Nursing home/rehabilitation note          Assessment and Plan:  Advanced dementia  History of alcohol abuse in the past  Anxiety and depression  Basal cell carcinoma of multiple sites   Allergic rhinitis     Currently her mental issues are  controlled with Seroquel 12.5 mg orally every 8 hours as needed   Haldol 2 mg as needed   Takes melatonin 6 mg at bed time to help her sleep     1. medications are reviewed      2. Continue with rehabilitative, supportive, and or restorative care as ordered and as the patient tolerates     3. Laboratory evaluations will be monitored on an ongoing as needed basis     4. Medications have been cross-referenced with the patient's diagnoses list, and medications reductions have been considered and/or implemented.     5. Pharmacy recommendations are addressed on an ongoing as needed basis.     6. Controlled substances have been electronically scripted every 60 days for opiates and others of similar schedule, and every 6 months for sedative/hypnotics and others of similar schedule.     7. Nursing has been queried about any potential adverse events that need to be reported to me.    Salient information and adjustment of care plan pertaining to this individual patient interaction today are the following:      A. We will continue with restorative and supportive care as the patient tolerates    B. Laboratory examinations will continue to be drawn on an ongoing as-needed basis. The patient's weight needs to be monitored and if needed we may need to institute appetite stimulating medication    C. The patient's long term prognosis is guarded    Charting is done using voice recognition software and may contain errors which may not have been completely corrected          source of history: Nurse, Medical personnel, Medical record, Patient.  History limitation: None.    HPI:    Patient is unable to give any detailed history and therefore history is obtained from the chart  No acute complaints voiced by the patient or acute concerns raised by nursing    Patient used to reside by herself in her home.  She was found wandering about couple of times  She was seen by psychiatry evaluated to be recommended that she be placed in a memory  care unit      Past history taken from chart includes  Basal cell carcinoma of the shoulder and skin of the forehead  Allergic rhinitis  Anxiety and depression      Past surgical history-  Excision of neuroma from digits of the toes  Hernia repair surgery  Ovarian cystectomy  Liposuction  Shoulder surgery    Social history-  Ex-smoker-15-pack-year history approximately  Used to drink 10 drinks a week of alcohol.  Unclear as to when she stopped drinking      Physical Exam:  Vital signs as per nursing/MA documentation were reviewed  General appearance: Alert and in no acute distress  HEENT: Normal Inspection  Neck - Normal Inspection  Respiratory : No respiratory distress. Lungs are clear   Cardiovascular: heart rate normal. No gallop  Back - normal inspection  Skin inspection:Warm  Musculoskeletal : No deformities  Neuro : Limited exam. Baseline    ROS: Review of symptoms is negative except for what is mentioned in HPI          Past Medical History:   Diagnosis Date    Personal history of other malignant neoplasm of skin 01/10/2017    History of basal cell carcinoma       Past Surgical History:   Procedure Laterality Date    COLONOSCOPY  01/10/2017    Complete Colonoscopy    HERNIA REPAIR  01/10/2017    Hernia Repair    OTHER SURGICAL HISTORY  01/10/2017    Excision Of Neuroma    OTHER SURGICAL HISTORY  01/10/2017    Ovarian Cystectomy    SHOULDER SURGERY  01/10/2017    Shoulder Surgery    SKIN CANCER EXCISION  01/10/2017    Mohs Micrographic Surgery Face    TOTAL KNEE ARTHROPLASTY  01/10/2017    Total Knee Arthroplasty         Charting was completed using voice recognition technology and may include unintended errors.    Discussed with patient/family, RN, and NP.

## 2023-10-10 ENCOUNTER — HOME VISIT (OUTPATIENT)
Dept: POST ACUTE CARE | Facility: EXTERNAL LOCATION | Age: 73
End: 2023-10-10
Payer: MEDICARE

## 2023-10-10 DIAGNOSIS — C44.81 BASAL CELL CARCINOMA (BCC) OF OVERLAPPING SITES OF SKIN: ICD-10-CM

## 2023-10-10 DIAGNOSIS — F03.918 AGGRESSIVE BEHAVIOR DUE TO DEMENTIA (MULTI): ICD-10-CM

## 2023-10-10 DIAGNOSIS — F32.A ANXIETY AND DEPRESSION: ICD-10-CM

## 2023-10-10 DIAGNOSIS — F32.4 MAJOR DEPRESSIVE DISORDER IN PARTIAL REMISSION, UNSPECIFIED WHETHER RECURRENT (CMS-HCC): Primary | ICD-10-CM

## 2023-10-10 DIAGNOSIS — F41.9 ANXIETY AND DEPRESSION: ICD-10-CM

## 2023-10-10 PROCEDURE — 99348 HOME/RES VST EST LOW MDM 30: CPT | Performed by: EMERGENCY MEDICINE

## 2023-10-17 ENCOUNTER — TELEPHONE (OUTPATIENT)
Dept: PRIMARY CARE | Facility: CLINIC | Age: 73
End: 2023-10-17

## 2023-10-19 NOTE — PROGRESS NOTES
Karolina Rice   73 y.o.  female  @location@            Assessment and Plan:  Advanced dementia  History of alcohol abuse in the past  Anxiety and depression  Basal cell carcinoma of multiple sites   Allergic rhinitis     Currently her mental issues are controlled with Seroquel 12.5 mg orally every 8 hours as needed   Haldol 2 mg as needed   Takes melatonin 6 mg at bed time to help her sleep     1. medications are reviewed      2. Continue with rehabilitative, supportive, and or restorative care as ordered and as the patient tolerates     3. Laboratory evaluations will be monitored on an ongoing as needed basis     4. Medications have been cross-referenced with the patient's diagnoses list, and medications reductions have been considered and/or implemented.     5. Pharmacy recommendations are addressed on an ongoing as needed basis.     6. Controlled substances have been electronically scripted every 60 days for opiates and others of similar schedule, and every 6 months for sedative/hypnotics and others of similar schedule.     7. Nursing has been queried about any potential adverse events that need to be reported to me.    Salient information and adjustment of care plan pertaining to this individual patient interaction today are the following:      A. We will continue with restorative and supportive care as the patient tolerates    B. Laboratory examinations will continue to be drawn on an ongoing as-needed basis. The patient's weight needs to be monitored and if needed we may need to institute appetite stimulating medication    C. The patient's long term prognosis is guarded    Charting is done using voice recognition software and may contain errors which may not have been completely corrected        source of history: Nurse, Medical personnel, Medical record, Patient.  History limitation: None.    HPI:    Patient is unable to give any detailed history and therefore history is obtained from the chart  No acute  complaints voiced by the patient or acute concerns raised by nursing    Patient used to reside by herself in her home.  She was found wandering about couple of times  She was seen by psychiatry evaluated to be recommended that she be placed in a memory care unit      Past history taken from chart includes  Basal cell carcinoma of the shoulder and skin of the forehead  Allergic rhinitis  Anxiety and depression      Past surgical history-  Excision of neuroma from digits of the toes  Hernia repair surgery  Ovarian cystectomy  Liposuction  Shoulder surgery    Social history-  Ex-smoker-15-pack-year history approximately  Used to drink 10 drinks a week of alcohol.  Unclear as to when she stopped drinking      Physical Exam:  Vital signs as per nursing/MA documentation were reviewed  General appearance: Alert and in no acute distress  HEENT: Normal Inspection  Neck - Normal Inspection  Respiratory : No respiratory distress. Lungs are clear   Cardiovascular: heart rate normal. No gallop  Back - normal inspection  Skin inspection:Warm  Musculoskeletal : No deformities  Neuro : Limited exam. Baseline    ROS: Review of symptoms is negative except for what is mentioned in HPI          Past Medical History:   Diagnosis Date    Personal history of other malignant neoplasm of skin 01/10/2017    History of basal cell carcinoma       Past Surgical History:   Procedure Laterality Date    COLONOSCOPY  01/10/2017    Complete Colonoscopy    HERNIA REPAIR  01/10/2017    Hernia Repair    OTHER SURGICAL HISTORY  01/10/2017    Excision Of Neuroma    OTHER SURGICAL HISTORY  01/10/2017    Ovarian Cystectomy    SHOULDER SURGERY  01/10/2017    Shoulder Surgery    SKIN CANCER EXCISION  01/10/2017    Mohs Micrographic Surgery Face    TOTAL KNEE ARTHROPLASTY  01/10/2017    Total Knee Arthroplasty         Charting was completed using voice recognition technology and may include unintended errors.    Discussed with patient/family, RN, and NP.

## 2023-11-09 ENCOUNTER — HOME VISIT (OUTPATIENT)
Dept: POST ACUTE CARE | Facility: EXTERNAL LOCATION | Age: 73
End: 2023-11-09
Payer: MEDICARE

## 2023-11-09 DIAGNOSIS — F02.C0 SEVERE DEMENTIA ASSOCIATED WITH OTHER UNDERLYING DISEASE, UNSPECIFIED WHETHER BEHAVIORAL, PSYCHOTIC, OR MOOD DISTURBANCE OR ANXIETY (MULTI): ICD-10-CM

## 2023-11-09 DIAGNOSIS — F03.918 AGGRESSIVE BEHAVIOR DUE TO DEMENTIA (MULTI): ICD-10-CM

## 2023-11-09 DIAGNOSIS — F32.4 MAJOR DEPRESSIVE DISORDER IN PARTIAL REMISSION, UNSPECIFIED WHETHER RECURRENT (CMS-HCC): Primary | ICD-10-CM

## 2023-11-09 DIAGNOSIS — F41.9 ANXIETY AND DEPRESSION: ICD-10-CM

## 2023-11-09 DIAGNOSIS — F32.A ANXIETY AND DEPRESSION: ICD-10-CM

## 2023-11-09 PROCEDURE — 99349 HOME/RES VST EST MOD MDM 40: CPT | Performed by: EMERGENCY MEDICINE

## 2023-11-10 NOTE — PROGRESS NOTES
Karolina Rice   73 y.o.  female  @location@            Assessment and Plan:  Advanced dementia  History of alcohol abuse in the past  Anxiety and depression  Basal cell carcinoma of multiple sites   Allergic rhinitis     Currently her mental issues are controlled with Seroquel 12.5 mg orally every 8 hours as needed   Haldol 2 mg as needed   Takes melatonin 6 mg at bed time to help her sleep     This patient was seen for my regular monthly visit, nursing evaluations and nursing notes were reviewed, interim events are reviewed, interim concerns and messages were reviewed as we have communicated with nursing staff.  Any issues with the falls, skin care impairment, declining physical condition are reviewed and noted, diagnosis list were reviewed, list of medications were reviewed, living will related issues were reviewed, overall patient has been doing well, any declining in patient's condition or any change in patient's condition needs to be notified to physician promptly, discussed with nursing staff, if needed would communicate with family.  Patient stays confined here at the facility for long-term care, there are always concerns about long-term care related issues and concerns.  Nursing staff is trying their best to keep patient safe, all sort of measures has been taken to keep patient safe and comfortable.           source of history: Nurse, Medical personnel, Medical record, Patient.  History limitation: None.    HPI:    Patient is unable to give any detailed history and therefore history is obtained from the chart  No acute complaints voiced by the patient or acute concerns raised by nursing    Patient used to reside by herself in her home.  She was found wandering about couple of times  She was seen by psychiatry evaluated to be recommended that she be placed in a memory care unit      Past history taken from chart includes  Basal cell carcinoma of the shoulder and skin of the forehead  Allergic  rhinitis  Anxiety and depression      Past surgical history-  Excision of neuroma from digits of the toes  Hernia repair surgery  Ovarian cystectomy  Liposuction  Shoulder surgery    Social history-  Ex-smoker-15-pack-year history approximately  Used to drink 10 drinks a week of alcohol.  Unclear as to when she stopped drinking      Physical Exam:  Vital signs as per nursing/MA documentation were reviewed  General appearance: Alert and in no acute distress  HEENT: Normal Inspection  Neck - Normal Inspection  Respiratory : No respiratory distress. Lungs are clear   Cardiovascular: heart rate normal. No gallop  Back - normal inspection  Skin inspection:Warm  Musculoskeletal : No deformities  Neuro : Limited exam. Baseline    ROS: Review of symptoms is negative except for what is mentioned in HPI          Past Medical History:   Diagnosis Date    Personal history of other malignant neoplasm of skin 01/10/2017    History of basal cell carcinoma       Past Surgical History:   Procedure Laterality Date    COLONOSCOPY  01/10/2017    Complete Colonoscopy    HERNIA REPAIR  01/10/2017    Hernia Repair    OTHER SURGICAL HISTORY  01/10/2017    Excision Of Neuroma    OTHER SURGICAL HISTORY  01/10/2017    Ovarian Cystectomy    SHOULDER SURGERY  01/10/2017    Shoulder Surgery    SKIN CANCER EXCISION  01/10/2017    Mohs Micrographic Surgery Face    TOTAL KNEE ARTHROPLASTY  01/10/2017    Total Knee Arthroplasty         Charting was completed using voice recognition technology and may include unintended errors.    Discussed with patient/family, RN, and NP.

## 2023-12-01 ENCOUNTER — HOME VISIT (OUTPATIENT)
Dept: POST ACUTE CARE | Facility: EXTERNAL LOCATION | Age: 73
End: 2023-12-01
Payer: MEDICARE

## 2023-12-01 DIAGNOSIS — F03.918 AGGRESSIVE BEHAVIOR DUE TO DEMENTIA (MULTI): Primary | ICD-10-CM

## 2023-12-01 DIAGNOSIS — F02.C0 SEVERE DEMENTIA ASSOCIATED WITH OTHER UNDERLYING DISEASE, UNSPECIFIED WHETHER BEHAVIORAL, PSYCHOTIC, OR MOOD DISTURBANCE OR ANXIETY (MULTI): ICD-10-CM

## 2023-12-01 DIAGNOSIS — F41.9 ANXIETY AND DEPRESSION: ICD-10-CM

## 2023-12-01 DIAGNOSIS — C44.81 BASAL CELL CARCINOMA (BCC) OF OVERLAPPING SITES OF SKIN: ICD-10-CM

## 2023-12-01 DIAGNOSIS — F32.A ANXIETY AND DEPRESSION: ICD-10-CM

## 2023-12-01 DIAGNOSIS — F32.4 MAJOR DEPRESSIVE DISORDER IN PARTIAL REMISSION, UNSPECIFIED WHETHER RECURRENT (CMS-HCC): ICD-10-CM

## 2023-12-01 PROCEDURE — 99348 HOME/RES VST EST LOW MDM 30: CPT | Performed by: EMERGENCY MEDICINE

## 2023-12-09 NOTE — PROGRESS NOTES
Karolina Rice   73 y.o.  female  @location@            Assessment and Plan:  Advanced dementia  History of alcohol abuse in the past  Anxiety and depression  Basal cell carcinoma of multiple sites   Allergic rhinitis     Currently her mental issues are controlled with Seroquel 12.5 mg orally every 8 hours as needed   Haldol 2 mg as needed   Takes melatonin 6 mg at bed time to help her sleep     -Fall prevention    -Cognitive engagement     -Monitor and treat blood pressure     -Aggressive decubitus ulcer prevention.     -Bowel and bladder care     -Optimal nutrition and supplementation as needed     -GI  and DVT prophylaxis     -Symptom control     -Ambulation as tolerated     -Will follow    Charting is done using voice recognition software and may contain errors which have not been completely corrected          source of history: Nurse, Medical personnel, Medical record, Patient.  History limitation: None.    HPI:    Patient is unable to give any detailed history and therefore history is obtained from the chart  No acute complaints voiced by the patient or acute concerns raised by nursing    Patient used to reside by herself in her home.  She was found wandering about couple of times  She was seen by psychiatry evaluated to be recommended that she be placed in a memory care unit      Past history taken from chart includes  Basal cell carcinoma of the shoulder and skin of the forehead  Allergic rhinitis  Anxiety and depression      Past surgical history-  Excision of neuroma from digits of the toes  Hernia repair surgery  Ovarian cystectomy  Liposuction  Shoulder surgery    Social history-  Ex-smoker-15-pack-year history approximately  Used to drink 10 drinks a week of alcohol.  Unclear as to when she stopped drinking      Physical Exam:  Vital signs as per nursing/MA documentation were reviewed  General appearance: Alert and in no acute distress  HEENT: Normal Inspection  Neck - Normal Inspection  Respiratory :  No respiratory distress. Lungs are clear   Cardiovascular: heart rate normal. No gallop  Back - normal inspection  Skin inspection:Warm  Musculoskeletal : No deformities  Neuro : Limited exam. Baseline    ROS: Review of symptoms is negative except for what is mentioned in HPI          Past Medical History:   Diagnosis Date    Personal history of other malignant neoplasm of skin 01/10/2017    History of basal cell carcinoma       Past Surgical History:   Procedure Laterality Date    COLONOSCOPY  01/10/2017    Complete Colonoscopy    HERNIA REPAIR  01/10/2017    Hernia Repair    OTHER SURGICAL HISTORY  01/10/2017    Excision Of Neuroma    OTHER SURGICAL HISTORY  01/10/2017    Ovarian Cystectomy    SHOULDER SURGERY  01/10/2017    Shoulder Surgery    SKIN CANCER EXCISION  01/10/2017    Mohs Micrographic Surgery Face    TOTAL KNEE ARTHROPLASTY  01/10/2017    Total Knee Arthroplasty         Charting was completed using voice recognition technology and may include unintended errors.    Discussed with patient/family, RN, and NP.

## 2024-01-18 ENCOUNTER — HOME VISIT (OUTPATIENT)
Dept: POST ACUTE CARE | Facility: EXTERNAL LOCATION | Age: 74
End: 2024-01-18
Payer: MEDICARE

## 2024-01-18 DIAGNOSIS — F32.4 MAJOR DEPRESSIVE DISORDER IN PARTIAL REMISSION, UNSPECIFIED WHETHER RECURRENT (CMS-HCC): ICD-10-CM

## 2024-01-18 DIAGNOSIS — F41.9 ANXIETY AND DEPRESSION: Primary | ICD-10-CM

## 2024-01-18 DIAGNOSIS — F32.A ANXIETY AND DEPRESSION: Primary | ICD-10-CM

## 2024-01-18 DIAGNOSIS — F02.C0 SEVERE DEMENTIA ASSOCIATED WITH OTHER UNDERLYING DISEASE, UNSPECIFIED WHETHER BEHAVIORAL, PSYCHOTIC, OR MOOD DISTURBANCE OR ANXIETY (MULTI): ICD-10-CM

## 2024-01-18 DIAGNOSIS — F03.918 AGGRESSIVE BEHAVIOR DUE TO DEMENTIA (MULTI): ICD-10-CM

## 2024-01-18 PROCEDURE — 99349 HOME/RES VST EST MOD MDM 40: CPT | Performed by: EMERGENCY MEDICINE

## 2024-02-05 NOTE — PROGRESS NOTES
Karolina Rice   73 y.o.  female  @location@            Assessment and Plan:  Advanced dementia  History of alcohol abuse in the past  Anxiety and depression  Basal cell carcinoma of multiple sites   Allergic rhinitis     Currently her mental issues are controlled with Seroquel 12.5 mg orally every 8 hours as needed   Haldol 2 mg as needed   Takes melatonin 6 mg at bed time to help her sleep     Provide safe environment for the patient     Continue current medication regimen     OT PT and speech therapy     Bowel and bladder,skin care     Nutritional support     monitor and treat blood glucose     GI  and DVT prophylaxis     PRN medications     Periodic lab work    Regular Follow up    Charting is done using voice recognition software and may contain errors which have not been completely corrected          source of history: Nurse, Medical personnel, Medical record, Patient.  History limitation: None.    HPI:    Patient is unable to give any detailed history and therefore history is obtained from the chart  No acute complaints voiced by the patient or acute concerns raised by nursing    Patient used to reside by herself in her home.  She was found wandering about couple of times  She was seen by psychiatry evaluated to be recommended that she be placed in a memory care unit      Past history taken from chart includes  Basal cell carcinoma of the shoulder and skin of the forehead  Allergic rhinitis  Anxiety and depression      Past surgical history-  Excision of neuroma from digits of the toes  Hernia repair surgery  Ovarian cystectomy  Liposuction  Shoulder surgery    Social history-  Ex-smoker-15-pack-year history approximately  Used to drink 10 drinks a week of alcohol.  Unclear as to when she stopped drinking      Physical Exam:  Vital signs as per nursing/MA documentation were reviewed  General appearance: Alert and in no acute distress  HEENT: Normal Inspection  Neck - Normal Inspection  Respiratory : No  respiratory distress. Lungs are clear   Cardiovascular: heart rate normal. No gallop  Back - normal inspection  Skin inspection:Warm  Musculoskeletal : No deformities  Neuro : Limited exam. Baseline    ROS: Review of symptoms is negative except for what is mentioned in HPI          Past Medical History:   Diagnosis Date    Personal history of other malignant neoplasm of skin 01/10/2017    History of basal cell carcinoma       Past Surgical History:   Procedure Laterality Date    COLONOSCOPY  01/10/2017    Complete Colonoscopy    HERNIA REPAIR  01/10/2017    Hernia Repair    OTHER SURGICAL HISTORY  01/10/2017    Excision Of Neuroma    OTHER SURGICAL HISTORY  01/10/2017    Ovarian Cystectomy    SHOULDER SURGERY  01/10/2017    Shoulder Surgery    SKIN CANCER EXCISION  01/10/2017    Mohs Micrographic Surgery Face    TOTAL KNEE ARTHROPLASTY  01/10/2017    Total Knee Arthroplasty         Charting was completed using voice recognition technology and may include unintended errors.    Discussed with patient/family, RN, and NP.

## 2024-02-15 ENCOUNTER — HOME VISIT (OUTPATIENT)
Dept: POST ACUTE CARE | Facility: EXTERNAL LOCATION | Age: 74
End: 2024-02-15
Payer: MEDICARE

## 2024-02-15 DIAGNOSIS — F32.4 MAJOR DEPRESSIVE DISORDER IN PARTIAL REMISSION, UNSPECIFIED WHETHER RECURRENT (CMS-HCC): ICD-10-CM

## 2024-02-15 DIAGNOSIS — F02.C0 SEVERE DEMENTIA ASSOCIATED WITH OTHER UNDERLYING DISEASE, UNSPECIFIED WHETHER BEHAVIORAL, PSYCHOTIC, OR MOOD DISTURBANCE OR ANXIETY (MULTI): ICD-10-CM

## 2024-02-15 DIAGNOSIS — F41.9 ANXIETY AND DEPRESSION: Primary | ICD-10-CM

## 2024-02-15 DIAGNOSIS — C44.81 BASAL CELL CARCINOMA (BCC) OF OVERLAPPING SITES OF SKIN: ICD-10-CM

## 2024-02-15 DIAGNOSIS — F32.A ANXIETY AND DEPRESSION: Primary | ICD-10-CM

## 2024-02-15 PROCEDURE — 99348 HOME/RES VST EST LOW MDM 30: CPT | Performed by: EMERGENCY MEDICINE

## 2024-02-17 NOTE — PROGRESS NOTES
Karolina Rice   73 y.o.  female  @location@            Assessment and Plan:  Advanced dementia  History of alcohol abuse in the past  Anxiety and depression  Basal cell carcinoma of multiple sites   Allergic rhinitis     Currently her mental issues are controlled with Seroquel 12.5 mg orally every 8 hours as needed   Haldol 2 mg as needed   Takes melatonin 6 mg at bed time to help her sleep     Rx list reviewed.   PT and OT evaluation is in the process.   Routine safety measures, fall precautions, risk modification and alarm placement if needed for prevention of falls.   Skin care precautions, prevention of pressures sores at pressure points assessed.   Pt needs to be monitored frequently by nursing staff particularly at night time.   Any confusion, agitation or behavioural disturbance needs to be attended, as per home policy   rapid covid Ag assay need to be done, notify if positive.   If needed appropriate measures to be taken for alarm placements and assisted devices, pt was told not to get up and ambulate at night unless help and assist available at bedside,   labs will be done as per our routine protocol.   PO intake need to be monitored if consuming po.       Charting is done using voice recognition software and may contain errors which have not been completely corrected  mable          source of history: Nurse, Medical personnel, Medical record, Patient.  History limitation: None.    HPI:    Patient is unable to give any detailed history and therefore history is obtained from the chart  No acute complaints voiced by the patient or acute concerns raised by nursing    Patient used to reside by herself in her home.  She was found wandering about couple of times  She was seen by psychiatry evaluated to be recommended that she be placed in a memory care unit      Past history taken from chart includes  Basal cell carcinoma of the shoulder and skin of the forehead  Allergic rhinitis  Anxiety and  depression      Past surgical history-  Excision of neuroma from digits of the toes  Hernia repair surgery  Ovarian cystectomy  Liposuction  Shoulder surgery    Social history-  Ex-smoker-15-pack-year history approximately  Used to drink 10 drinks a week of alcohol.  Unclear as to when she stopped drinking      Physical Exam:  Vital signs as per nursing/MA documentation were reviewed  General appearance: Alert and in no acute distress  HEENT: Normal Inspection  Neck - Normal Inspection  Respiratory : No respiratory distress. Lungs are clear   Cardiovascular: heart rate normal. No gallop  Back - normal inspection  Skin inspection:Warm  Musculoskeletal : No deformities  Neuro : Limited exam. Baseline    ROS: Review of symptoms is negative except for what is mentioned in HPI          Past Medical History:   Diagnosis Date    Personal history of other malignant neoplasm of skin 01/10/2017    History of basal cell carcinoma       Past Surgical History:   Procedure Laterality Date    COLONOSCOPY  01/10/2017    Complete Colonoscopy    HERNIA REPAIR  01/10/2017    Hernia Repair    OTHER SURGICAL HISTORY  01/10/2017    Excision Of Neuroma    OTHER SURGICAL HISTORY  01/10/2017    Ovarian Cystectomy    SHOULDER SURGERY  01/10/2017    Shoulder Surgery    SKIN CANCER EXCISION  01/10/2017    Mohs Micrographic Surgery Face    TOTAL KNEE ARTHROPLASTY  01/10/2017    Total Knee Arthroplasty         Charting was completed using voice recognition technology and may include unintended errors.    Discussed with patient/family, RN, and NP.

## 2024-03-12 ENCOUNTER — NURSING HOME VISIT (OUTPATIENT)
Dept: POST ACUTE CARE | Facility: EXTERNAL LOCATION | Age: 74
End: 2024-03-12
Payer: MEDICARE

## 2024-03-12 DIAGNOSIS — F02.C0 SEVERE DEMENTIA ASSOCIATED WITH OTHER UNDERLYING DISEASE, UNSPECIFIED WHETHER BEHAVIORAL, PSYCHOTIC, OR MOOD DISTURBANCE OR ANXIETY (MULTI): ICD-10-CM

## 2024-03-12 DIAGNOSIS — F32.4 MAJOR DEPRESSIVE DISORDER IN PARTIAL REMISSION, UNSPECIFIED WHETHER RECURRENT (CMS-HCC): ICD-10-CM

## 2024-03-12 DIAGNOSIS — F03.918 AGGRESSIVE BEHAVIOR DUE TO DEMENTIA (MULTI): ICD-10-CM

## 2024-03-12 PROCEDURE — 99349 HOME/RES VST EST MOD MDM 40: CPT | Performed by: EMERGENCY MEDICINE

## 2024-03-12 NOTE — LETTER
Patient: Karolina Rice  : 1950    Encounter Date: 2024    Karolina Rice   73 y.o.  female  @location@                 Assessment and Plan:  Advanced dementia  History of alcohol abuse in the past  Anxiety and depression  Basal cell carcinoma of multiple sites   Allergic rhinitis      Currently her mental issues are controlled with Seroquel 12.5 mg orally every 8 hours as needed   Haldol 2 mg as needed   Takes melatonin 6 mg at bed time to help her sleep      1. medications are reviewed      2. Continue with rehabilitative, supportive, and or restorative care as ordered and as the patient tolerates     3. Laboratory evaluations will be monitored on an ongoing as needed basis     4. Medications have been cross-referenced with the patient's diagnoses list, and medications reductions have been considered and/or implemented.     5. Pharmacy recommendations are addressed on an ongoing as needed basis.     6. Controlled substances have been electronically scripted every 60 days for opiates and others of similar schedule, and every 6 months for sedative/hypnotics and others of similar schedule.     7. Nursing has been queried about any potential adverse events that need to be reported to me.    Salient information and adjustment of care plan pertaining to this individual patient interaction today are the following:      A. We will continue with restorative and supportive care as the patient tolerates    B. Laboratory examinations will continue to be drawn on an ongoing as-needed basis. The patient's weight needs to be monitored and if needed we may need to institute appetite stimulating medication    C. The patient's long term prognosis is guarded    Charting is done using voice recognition software and may contain errors which may not have been completely corrected                source of history: Nurse, Medical personnel, Medical record, Patient.  History limitation: None.     HPI:     Patient is unable  to give any detailed history and therefore history is obtained from the chart  No acute complaints voiced by the patient or acute concerns raised by nursing     Patient used to reside by herself in her home.  She was found wandering about couple of times  She was seen by psychiatry evaluated to be recommended that she be placed in a memory care unit        Past history taken from chart includes  Basal cell carcinoma of the shoulder and skin of the forehead  Allergic rhinitis  Anxiety and depression        Past surgical history-  Excision of neuroma from digits of the toes  Hernia repair surgery  Ovarian cystectomy  Liposuction  Shoulder surgery     Social history-  Ex-smoker-15-pack-year history approximately  Used to drink 10 drinks a week of alcohol.  Unclear as to when she stopped drinking        Physical Exam:  Vital signs as per nursing/MA documentation were reviewed  General appearance: Alert and in no acute distress  HEENT: Normal Inspection  Neck - Normal Inspection  Respiratory : No respiratory distress. Lungs are clear   Cardiovascular: heart rate normal. No gallop  Back - normal inspection  Skin inspection:Warm  Musculoskeletal : No deformities  Neuro : Limited exam. Baseline     ROS: Review of symptoms is negative except for what is mentioned in HPI              Medical History        Past Medical History:   Diagnosis Date   • Personal history of other malignant neoplasm of skin 01/10/2017     History of basal cell carcinoma            Surgical History         Past Surgical History:   Procedure Laterality Date   • COLONOSCOPY   01/10/2017     Complete Colonoscopy   • HERNIA REPAIR   01/10/2017     Hernia Repair   • OTHER SURGICAL HISTORY   01/10/2017     Excision Of Neuroma   • OTHER SURGICAL HISTORY   01/10/2017     Ovarian Cystectomy   • SHOULDER SURGERY   01/10/2017     Shoulder Surgery   • SKIN CANCER EXCISION   01/10/2017     Mohs Micrographic Surgery Face   • TOTAL KNEE ARTHROPLASTY   01/10/2017      Total Knee Arthroplasty               Charting was completed using voice recognition technology and may include unintended errors.     Discussed with patient/family, RN, and NP.      Electronically Signed By: Edmar Louise MD   3/25/24  9:25 AM

## 2024-03-21 NOTE — PROGRESS NOTES
Karolina Rice   73 y.o.  female  @location@                 Assessment and Plan:  Advanced dementia  History of alcohol abuse in the past  Anxiety and depression  Basal cell carcinoma of multiple sites   Allergic rhinitis      Currently her mental issues are controlled with Seroquel 12.5 mg orally every 8 hours as needed   Haldol 2 mg as needed   Takes melatonin 6 mg at bed time to help her sleep      1. medications are reviewed      2. Continue with rehabilitative, supportive, and or restorative care as ordered and as the patient tolerates     3. Laboratory evaluations will be monitored on an ongoing as needed basis     4. Medications have been cross-referenced with the patient's diagnoses list, and medications reductions have been considered and/or implemented.     5. Pharmacy recommendations are addressed on an ongoing as needed basis.     6. Controlled substances have been electronically scripted every 60 days for opiates and others of similar schedule, and every 6 months for sedative/hypnotics and others of similar schedule.     7. Nursing has been queried about any potential adverse events that need to be reported to me.    Salient information and adjustment of care plan pertaining to this individual patient interaction today are the following:      A. We will continue with restorative and supportive care as the patient tolerates    B. Laboratory examinations will continue to be drawn on an ongoing as-needed basis. The patient's weight needs to be monitored and if needed we may need to institute appetite stimulating medication    C. The patient's long term prognosis is guarded    Charting is done using voice recognition software and may contain errors which may not have been completely corrected                source of history: Nurse, Medical personnel, Medical record, Patient.  History limitation: None.     HPI:     Patient is unable to give any detailed history and therefore history is obtained from the  chart  No acute complaints voiced by the patient or acute concerns raised by nursing     Patient used to reside by herself in her home.  She was found wandering about couple of times  She was seen by psychiatry evaluated to be recommended that she be placed in a memory care unit        Past history taken from chart includes  Basal cell carcinoma of the shoulder and skin of the forehead  Allergic rhinitis  Anxiety and depression        Past surgical history-  Excision of neuroma from digits of the toes  Hernia repair surgery  Ovarian cystectomy  Liposuction  Shoulder surgery     Social history-  Ex-smoker-15-pack-year history approximately  Used to drink 10 drinks a week of alcohol.  Unclear as to when she stopped drinking        Physical Exam:  Vital signs as per nursing/MA documentation were reviewed  General appearance: Alert and in no acute distress  HEENT: Normal Inspection  Neck - Normal Inspection  Respiratory : No respiratory distress. Lungs are clear   Cardiovascular: heart rate normal. No gallop  Back - normal inspection  Skin inspection:Warm  Musculoskeletal : No deformities  Neuro : Limited exam. Baseline     ROS: Review of symptoms is negative except for what is mentioned in HPI              Medical History        Past Medical History:   Diagnosis Date    Personal history of other malignant neoplasm of skin 01/10/2017     History of basal cell carcinoma            Surgical History         Past Surgical History:   Procedure Laterality Date    COLONOSCOPY   01/10/2017     Complete Colonoscopy    HERNIA REPAIR   01/10/2017     Hernia Repair    OTHER SURGICAL HISTORY   01/10/2017     Excision Of Neuroma    OTHER SURGICAL HISTORY   01/10/2017     Ovarian Cystectomy    SHOULDER SURGERY   01/10/2017     Shoulder Surgery    SKIN CANCER EXCISION   01/10/2017     Mohs Micrographic Surgery Face    TOTAL KNEE ARTHROPLASTY   01/10/2017     Total Knee Arthroplasty               Charting was completed using voice  recognition technology and may include unintended errors.     Discussed with patient/family, RN, and NP.

## 2024-04-09 ENCOUNTER — HOME VISIT (OUTPATIENT)
Dept: POST ACUTE CARE | Facility: EXTERNAL LOCATION | Age: 74
End: 2024-04-09
Payer: MEDICARE

## 2024-04-09 DIAGNOSIS — F03.918 AGGRESSIVE BEHAVIOR DUE TO DEMENTIA (MULTI): Primary | ICD-10-CM

## 2024-04-09 DIAGNOSIS — F32.4 MAJOR DEPRESSIVE DISORDER IN PARTIAL REMISSION, UNSPECIFIED WHETHER RECURRENT (CMS-HCC): ICD-10-CM

## 2024-04-09 DIAGNOSIS — C44.81 BASAL CELL CARCINOMA (BCC) OF OVERLAPPING SITES OF SKIN: ICD-10-CM

## 2024-04-09 DIAGNOSIS — F02.C0 SEVERE DEMENTIA ASSOCIATED WITH OTHER UNDERLYING DISEASE, UNSPECIFIED WHETHER BEHAVIORAL, PSYCHOTIC, OR MOOD DISTURBANCE OR ANXIETY (MULTI): ICD-10-CM

## 2024-04-09 PROCEDURE — 99348 HOME/RES VST EST LOW MDM 30: CPT | Performed by: EMERGENCY MEDICINE

## 2024-04-11 NOTE — PROGRESS NOTES
Karolina Rice   73 y.o.  female  @location@                 Assessment and Plan:      Advanced dementia  History of alcohol abuse in the past  Anxiety and depression  Basal cell carcinoma of multiple sites   Allergic rhinitis      Currently her mental issues are controlled with Seroquel 12.5 mg orally every 8 hours as needed   Haldol 2 mg as needed   Takes melatonin 6 mg at bed time to help her sleep      This patient was seen for my regular monthly visit, nursing evaluations and nursing notes were reviewed, interim events are reviewed, interim concerns and messages were reviewed as we have communicated with nursing staff.  Any issues with the falls, skin care impairment, declining physical condition are reviewed and noted, diagnosis list were reviewed, list of medications were reviewed, living will related issues were reviewed, overall patient has been doing well, any declining in patient's condition or any change in patient's condition needs to be notified to physician promptly, discussed with nursing staff, if needed would communicate with family.  Patient stays confined here at the facility for long-term care, there are always concerns about long-term care related issues and concerns.  Nursing staff is trying their best to keep patient safe, all sort of measures has been taken to keep patient safe and comfortable.               source of history: Nurse, Medical personnel, Medical record, Patient.  History limitation: None.     HPI:     Patient is unable to give any detailed history and therefore history is obtained from the chart  No acute complaints voiced by the patient or acute concerns raised by nursing     Patient used to reside by herself in her home.  She was found wandering about couple of times  She was seen by psychiatry evaluated to be recommended that she be placed in a memory care unit        Past history taken from chart includes  Basal cell carcinoma of the shoulder and skin of the  forehead  Allergic rhinitis  Anxiety and depression        Past surgical history-  Excision of neuroma from digits of the toes  Hernia repair surgery  Ovarian cystectomy  Liposuction  Shoulder surgery     Social history-  Ex-smoker-15-pack-year history approximately  Used to drink 10 drinks a week of alcohol.  Unclear as to when she stopped drinking        Physical Exam:  Vital signs as per nursing/MA documentation were reviewed  General appearance: Alert and in no acute distress  HEENT: Normal Inspection  Neck - Normal Inspection  Respiratory : No respiratory distress. Lungs are clear   Cardiovascular: heart rate normal. No gallop  Back - normal inspection  Skin inspection:Warm  Musculoskeletal : No deformities  Neuro : Limited exam. Baseline     ROS: Review of symptoms is negative except for what is mentioned in HPI              Medical History        Past Medical History:   Diagnosis Date    Personal history of other malignant neoplasm of skin 01/10/2017     History of basal cell carcinoma            Surgical History         Past Surgical History:   Procedure Laterality Date    COLONOSCOPY   01/10/2017     Complete Colonoscopy    HERNIA REPAIR   01/10/2017     Hernia Repair    OTHER SURGICAL HISTORY   01/10/2017     Excision Of Neuroma    OTHER SURGICAL HISTORY   01/10/2017     Ovarian Cystectomy    SHOULDER SURGERY   01/10/2017     Shoulder Surgery    SKIN CANCER EXCISION   01/10/2017     Mohs Micrographic Surgery Face    TOTAL KNEE ARTHROPLASTY   01/10/2017     Total Knee Arthroplasty               Charting was completed using voice recognition technology and may include unintended errors.     Discussed with patient/family, RN, and NP.

## 2024-05-14 ENCOUNTER — HOME VISIT (OUTPATIENT)
Dept: POST ACUTE CARE | Facility: EXTERNAL LOCATION | Age: 74
End: 2024-05-14
Payer: MEDICARE

## 2024-05-14 DIAGNOSIS — F03.918 AGGRESSIVE BEHAVIOR DUE TO DEMENTIA (MULTI): Primary | ICD-10-CM

## 2024-05-14 DIAGNOSIS — F02.C0 SEVERE DEMENTIA ASSOCIATED WITH OTHER UNDERLYING DISEASE, UNSPECIFIED WHETHER BEHAVIORAL, PSYCHOTIC, OR MOOD DISTURBANCE OR ANXIETY (MULTI): ICD-10-CM

## 2024-05-14 DIAGNOSIS — F32.4 MAJOR DEPRESSIVE DISORDER IN PARTIAL REMISSION, UNSPECIFIED WHETHER RECURRENT (CMS-HCC): ICD-10-CM

## 2024-05-14 DIAGNOSIS — C44.81 BASAL CELL CARCINOMA (BCC) OF OVERLAPPING SITES OF SKIN: ICD-10-CM

## 2024-05-14 PROCEDURE — 99349 HOME/RES VST EST MOD MDM 40: CPT | Performed by: EMERGENCY MEDICINE

## 2024-05-16 NOTE — PROGRESS NOTES
Karolina Rice   73 y.o.  female  @location@                 Assessment and Plan:      Advanced dementia  History of alcohol abuse in the past  Anxiety and depression  Basal cell carcinoma of multiple sites   Allergic rhinitis      Currently her mental issues are controlled with Seroquel 12.5 mg orally every 8 hours as needed   Haldol 2 mg as needed   Takes melatonin 6 mg at bed time to help her sleep      -Fall prevention    -Cognitive engagement     -Monitor and treat blood pressure     -Aggressive decubitus ulcer prevention.     -Bowel and bladder care     -Optimal nutrition and supplementation as needed     -GI  and DVT prophylaxis     -Symptom control     -Ambulation as tolerated     -Will follow    Charting is done using voice recognition software and may contain errors which have not been completely corrected                source of history: Nurse, Medical personnel, Medical record, Patient.  History limitation: None.     HPI:     Patient is unable to give any detailed history and therefore history is obtained from the chart  No acute complaints voiced by the patient or acute concerns raised by nursing     Patient used to reside by herself in her home.  She was found wandering about couple of times  She was seen by psychiatry evaluated to be recommended that she be placed in a memory care unit        Past history taken from chart includes  Basal cell carcinoma of the shoulder and skin of the forehead  Allergic rhinitis  Anxiety and depression        Past surgical history-  Excision of neuroma from digits of the toes  Hernia repair surgery  Ovarian cystectomy  Liposuction  Shoulder surgery     Social history-  Ex-smoker-15-pack-year history approximately  Used to drink 10 drinks a week of alcohol.  Unclear as to when she stopped drinking        Physical Exam:  Vital signs as per nursing/MA documentation were reviewed  General appearance: Alert and in no acute distress  HEENT: Normal Inspection  Neck - Normal  Inspection  Respiratory : No respiratory distress. Lungs are clear   Cardiovascular: heart rate normal. No gallop  Back - normal inspection  Skin inspection:Warm  Musculoskeletal : No deformities  Neuro : Limited exam. Baseline     ROS: Review of symptoms is negative except for what is mentioned in HPI              Medical History        Past Medical History:   Diagnosis Date    Personal history of other malignant neoplasm of skin 01/10/2017     History of basal cell carcinoma            Surgical History         Past Surgical History:   Procedure Laterality Date    COLONOSCOPY   01/10/2017     Complete Colonoscopy    HERNIA REPAIR   01/10/2017     Hernia Repair    OTHER SURGICAL HISTORY   01/10/2017     Excision Of Neuroma    OTHER SURGICAL HISTORY   01/10/2017     Ovarian Cystectomy    SHOULDER SURGERY   01/10/2017     Shoulder Surgery    SKIN CANCER EXCISION   01/10/2017     Mohs Micrographic Surgery Face    TOTAL KNEE ARTHROPLASTY   01/10/2017     Total Knee Arthroplasty               Charting was completed using voice recognition technology and may include unintended errors.     Discussed with patient/family, RN, and NP.

## 2024-06-13 ENCOUNTER — HOME VISIT (OUTPATIENT)
Dept: POST ACUTE CARE | Facility: EXTERNAL LOCATION | Age: 74
End: 2024-06-13
Payer: MEDICARE

## 2024-06-13 DIAGNOSIS — F02.C0 SEVERE DEMENTIA ASSOCIATED WITH OTHER UNDERLYING DISEASE, UNSPECIFIED WHETHER BEHAVIORAL, PSYCHOTIC, OR MOOD DISTURBANCE OR ANXIETY (MULTI): ICD-10-CM

## 2024-06-13 DIAGNOSIS — C44.81 BASAL CELL CARCINOMA (BCC) OF OVERLAPPING SITES OF SKIN: ICD-10-CM

## 2024-06-13 DIAGNOSIS — F03.918 AGGRESSIVE BEHAVIOR DUE TO DEMENTIA (MULTI): ICD-10-CM

## 2024-06-13 DIAGNOSIS — F32.4 MAJOR DEPRESSIVE DISORDER IN PARTIAL REMISSION, UNSPECIFIED WHETHER RECURRENT (CMS-HCC): Primary | ICD-10-CM

## 2024-06-13 PROCEDURE — 99348 HOME/RES VST EST LOW MDM 30: CPT | Performed by: EMERGENCY MEDICINE

## 2024-06-20 NOTE — PROGRESS NOTES
Karolina Rice   73 y.o.  female  @location@                 Assessment and Plan:      Advanced dementia  History of alcohol abuse in the past  Anxiety and depression  Basal cell carcinoma of multiple sites   Allergic rhinitis      Currently her mental issues are controlled with Seroquel 12.5 mg orally every 8 hours as needed   Haldol 2 mg as needed   Takes melatonin 6 mg at bed time to help her sleep      Provide safe environment for the patient     Continue current medication regimen     OT PT and speech therapy     Bowel and bladder,skin care     Nutritional support     monitor and treat blood glucose     GI  and DVT prophylaxis     PRN medications     Periodic lab work    Regular Follow up    Charting is done using voice recognition software and may contain errors which have not been completely corrected                source of history: Nurse, Medical personnel, Medical record, Patient.  History limitation: None.     HPI:     Patient is unable to give any detailed history and therefore history is obtained from the chart  No acute complaints voiced by the patient or acute concerns raised by nursing     Patient used to reside by herself in her home.  She was found wandering about couple of times  She was seen by psychiatry evaluated to be recommended that she be placed in a memory care unit        Past history taken from chart includes  Basal cell carcinoma of the shoulder and skin of the forehead  Allergic rhinitis  Anxiety and depression        Past surgical history-  Excision of neuroma from digits of the toes  Hernia repair surgery  Ovarian cystectomy  Liposuction  Shoulder surgery     Social history-  Ex-smoker-15-pack-year history approximately  Used to drink 10 drinks a week of alcohol.  Unclear as to when she stopped drinking        Physical Exam:  Vital signs as per nursing/MA documentation were reviewed  General appearance: Alert and in no acute distress  HEENT: Normal Inspection  Neck - Normal  Inspection  Respiratory : No respiratory distress. Lungs are clear   Cardiovascular: heart rate normal. No gallop  Back - normal inspection  Skin inspection:Warm  Musculoskeletal : No deformities  Neuro : Limited exam. Baseline     ROS: Review of symptoms is negative except for what is mentioned in HPI              Medical History        Past Medical History:   Diagnosis Date    Personal history of other malignant neoplasm of skin 01/10/2017     History of basal cell carcinoma            Surgical History         Past Surgical History:   Procedure Laterality Date    COLONOSCOPY   01/10/2017     Complete Colonoscopy    HERNIA REPAIR   01/10/2017     Hernia Repair    OTHER SURGICAL HISTORY   01/10/2017     Excision Of Neuroma    OTHER SURGICAL HISTORY   01/10/2017     Ovarian Cystectomy    SHOULDER SURGERY   01/10/2017     Shoulder Surgery    SKIN CANCER EXCISION   01/10/2017     Mohs Micrographic Surgery Face    TOTAL KNEE ARTHROPLASTY   01/10/2017     Total Knee Arthroplasty               Charting was completed using voice recognition technology and may include unintended errors.     Discussed with patient/family, RN, and NP.

## 2024-06-25 ENCOUNTER — TELEPHONE (OUTPATIENT)
Dept: PRIMARY CARE | Facility: CLINIC | Age: 74
End: 2024-06-25
Payer: MEDICARE

## 2024-06-25 NOTE — TELEPHONE ENCOUNTER
O/T ONCE A WEEK FOR 5 WEEKS   P/T IS ALSO SEEING THIS PATIENT    NURSE HAS NOTICED LEFT SIDED NEGLECT  PATIENT IS HOLDING LEFT SHOULDER    PATIENT FELL LAST WEEK. STATED IT WAS ON THE RIGHT  SIDE.... THERAPIST BELIEVES IT WAS LEFT SIDE

## 2024-07-09 ENCOUNTER — NURSING HOME VISIT (OUTPATIENT)
Dept: POST ACUTE CARE | Facility: EXTERNAL LOCATION | Age: 74
End: 2024-07-09
Payer: MEDICARE

## 2024-07-09 DIAGNOSIS — F32.A ANXIETY AND DEPRESSION: ICD-10-CM

## 2024-07-09 DIAGNOSIS — F41.9 ANXIETY AND DEPRESSION: ICD-10-CM

## 2024-07-09 DIAGNOSIS — F03.918 AGGRESSIVE BEHAVIOR DUE TO DEMENTIA (MULTI): Primary | ICD-10-CM

## 2024-07-09 DIAGNOSIS — F02.C0 SEVERE DEMENTIA ASSOCIATED WITH OTHER UNDERLYING DISEASE, UNSPECIFIED WHETHER BEHAVIORAL, PSYCHOTIC, OR MOOD DISTURBANCE OR ANXIETY (MULTI): ICD-10-CM

## 2024-07-09 DIAGNOSIS — C44.81 BASAL CELL CARCINOMA (BCC) OF OVERLAPPING SITES OF SKIN: ICD-10-CM

## 2024-07-09 NOTE — LETTER
Patient: Karolina Rice  : 1950    Encounter Date: 2024    Karolina Rice   73 y.o.  female  @location@                 Assessment and Plan:      Advanced dementia  History of alcohol abuse in the past  Anxiety and depression  Basal cell carcinoma of multiple sites   Allergic rhinitis      Currently her mental issues are controlled with Seroquel 12.5 mg orally every 8 hours as needed   Haldol 2 mg as needed   Takes melatonin 6 mg at bed time to help her sleep      Rx list reviewed.   PT and OT evaluation is in the process.   Routine safety measures, fall precautions, risk modification and alarm placement if needed for prevention of falls.   Skin care precautions, prevention of pressures sores at pressure points assessed.   Pt needs to be monitored frequently by nursing staff particularly at night time.   Any confusion, agitation or behavioural disturbance needs to be attended, as per home policy   rapid covid Ag assay need to be done, notify if positive.   If needed appropriate measures to be taken for alarm placements and assisted devices, pt was told not to get up and ambulate at night unless help and assist available at bedside,   labs will be done as per our routine protocol.   PO intake need to be monitored if consuming po.       Charting is done using voice recognition software and may contain errors which have not been completely corrected      Charting is done using voice recognition software and may contain errors which have not been completely corrected                source of history: Nurse, Medical personnel, Medical record, Patient.  History limitation: None.     HPI:     Patient is unable to give any detailed history and therefore history is obtained from the chart  No acute complaints voiced by the patient or acute concerns raised by nursing     Patient used to reside by herself in her home.  She was found wandering about couple of times  She was seen by psychiatry evaluated to be  recommended that she be placed in a memory care unit        Past history taken from chart includes  Basal cell carcinoma of the shoulder and skin of the forehead  Allergic rhinitis  Anxiety and depression        Past surgical history-  Excision of neuroma from digits of the toes  Hernia repair surgery  Ovarian cystectomy  Liposuction  Shoulder surgery     Social history-  Ex-smoker-15-pack-year history approximately  Used to drink 10 drinks a week of alcohol.  Unclear as to when she stopped drinking        Physical Exam:  Vital signs as per nursing/MA documentation were reviewed  General appearance: Alert and in no acute distress  HEENT: Normal Inspection  Neck - Normal Inspection  Respiratory : No respiratory distress. Lungs are clear   Cardiovascular: heart rate normal. No gallop  Back - normal inspection  Skin inspection:Warm  Musculoskeletal : No deformities  Neuro : Limited exam. Baseline     ROS: Review of symptoms is negative except for what is mentioned in HPI              Medical History        Past Medical History:   Diagnosis Date   • Personal history of other malignant neoplasm of skin 01/10/2017     History of basal cell carcinoma            Surgical History         Past Surgical History:   Procedure Laterality Date   • COLONOSCOPY   01/10/2017     Complete Colonoscopy   • HERNIA REPAIR   01/10/2017     Hernia Repair   • OTHER SURGICAL HISTORY   01/10/2017     Excision Of Neuroma   • OTHER SURGICAL HISTORY   01/10/2017     Ovarian Cystectomy   • SHOULDER SURGERY   01/10/2017     Shoulder Surgery   • SKIN CANCER EXCISION   01/10/2017     Mohs Micrographic Surgery Face   • TOTAL KNEE ARTHROPLASTY   01/10/2017     Total Knee Arthroplasty               Charting was completed using voice recognition technology and may include unintended errors.     Discussed with patient/family, RN, and NP.      Electronically Signed By: Edmar Louise MD   8/3/24  8:30 AM

## 2024-07-29 NOTE — PROGRESS NOTES
Karolina Rice   73 y.o.  female  @location@                 Assessment and Plan:      Advanced dementia  History of alcohol abuse in the past  Anxiety and depression  Basal cell carcinoma of multiple sites   Allergic rhinitis      Currently her mental issues are controlled with Seroquel 12.5 mg orally every 8 hours as needed   Haldol 2 mg as needed   Takes melatonin 6 mg at bed time to help her sleep      Rx list reviewed.   PT and OT evaluation is in the process.   Routine safety measures, fall precautions, risk modification and alarm placement if needed for prevention of falls.   Skin care precautions, prevention of pressures sores at pressure points assessed.   Pt needs to be monitored frequently by nursing staff particularly at night time.   Any confusion, agitation or behavioural disturbance needs to be attended, as per home policy   rapid covid Ag assay need to be done, notify if positive.   If needed appropriate measures to be taken for alarm placements and assisted devices, pt was told not to get up and ambulate at night unless help and assist available at bedside,   labs will be done as per our routine protocol.   PO intake need to be monitored if consuming po.       Charting is done using voice recognition software and may contain errors which have not been completely corrected      Charting is done using voice recognition software and may contain errors which have not been completely corrected                source of history: Nurse, Medical personnel, Medical record, Patient.  History limitation: None.     HPI:     Patient is unable to give any detailed history and therefore history is obtained from the chart  No acute complaints voiced by the patient or acute concerns raised by nursing     Patient used to reside by herself in her home.  She was found wandering about couple of times  She was seen by psychiatry evaluated to be recommended that she be placed in a memory care unit        Past history  taken from chart includes  Basal cell carcinoma of the shoulder and skin of the forehead  Allergic rhinitis  Anxiety and depression        Past surgical history-  Excision of neuroma from digits of the toes  Hernia repair surgery  Ovarian cystectomy  Liposuction  Shoulder surgery     Social history-  Ex-smoker-15-pack-year history approximately  Used to drink 10 drinks a week of alcohol.  Unclear as to when she stopped drinking        Physical Exam:  Vital signs as per nursing/MA documentation were reviewed  General appearance: Alert and in no acute distress  HEENT: Normal Inspection  Neck - Normal Inspection  Respiratory : No respiratory distress. Lungs are clear   Cardiovascular: heart rate normal. No gallop  Back - normal inspection  Skin inspection:Warm  Musculoskeletal : No deformities  Neuro : Limited exam. Baseline     ROS: Review of symptoms is negative except for what is mentioned in HPI              Medical History        Past Medical History:   Diagnosis Date    Personal history of other malignant neoplasm of skin 01/10/2017     History of basal cell carcinoma            Surgical History         Past Surgical History:   Procedure Laterality Date    COLONOSCOPY   01/10/2017     Complete Colonoscopy    HERNIA REPAIR   01/10/2017     Hernia Repair    OTHER SURGICAL HISTORY   01/10/2017     Excision Of Neuroma    OTHER SURGICAL HISTORY   01/10/2017     Ovarian Cystectomy    SHOULDER SURGERY   01/10/2017     Shoulder Surgery    SKIN CANCER EXCISION   01/10/2017     Mohs Micrographic Surgery Face    TOTAL KNEE ARTHROPLASTY   01/10/2017     Total Knee Arthroplasty               Charting was completed using voice recognition technology and may include unintended errors.     Discussed with patient/family, RN, and NP.

## 2024-08-08 ENCOUNTER — HOME VISIT (OUTPATIENT)
Dept: POST ACUTE CARE | Facility: EXTERNAL LOCATION | Age: 74
End: 2024-08-08
Payer: MEDICARE

## 2024-08-08 DIAGNOSIS — F32.4 MAJOR DEPRESSIVE DISORDER IN PARTIAL REMISSION, UNSPECIFIED WHETHER RECURRENT (CMS-HCC): ICD-10-CM

## 2024-08-08 DIAGNOSIS — F03.918 AGGRESSIVE BEHAVIOR DUE TO DEMENTIA (MULTI): Primary | ICD-10-CM

## 2024-08-08 DIAGNOSIS — F02.C0 SEVERE DEMENTIA ASSOCIATED WITH OTHER UNDERLYING DISEASE, UNSPECIFIED WHETHER BEHAVIORAL, PSYCHOTIC, OR MOOD DISTURBANCE OR ANXIETY (MULTI): ICD-10-CM

## 2024-08-08 DIAGNOSIS — C44.81 BASAL CELL CARCINOMA (BCC) OF OVERLAPPING SITES OF SKIN: ICD-10-CM

## 2024-09-17 ENCOUNTER — NURSING HOME VISIT (OUTPATIENT)
Dept: POST ACUTE CARE | Facility: EXTERNAL LOCATION | Age: 74
End: 2024-09-17
Payer: MEDICARE

## 2024-09-17 DIAGNOSIS — F10.11 HISTORY OF ALCOHOL ABUSE: ICD-10-CM

## 2024-09-17 DIAGNOSIS — F02.C0: ICD-10-CM

## 2024-09-17 DIAGNOSIS — C44.81 BASAL CELL CARCINOMA (BCC) OF OVERLAPPING SITES OF SKIN: ICD-10-CM

## 2024-09-17 DIAGNOSIS — Z00.00 WELLNESS EXAMINATION: Primary | ICD-10-CM

## 2024-09-17 DIAGNOSIS — F41.9 ANXIETY AND DEPRESSION: ICD-10-CM

## 2024-09-17 DIAGNOSIS — F32.A ANXIETY AND DEPRESSION: ICD-10-CM

## 2024-09-17 DIAGNOSIS — F32.4 MAJOR DEPRESSIVE DISORDER IN PARTIAL REMISSION, UNSPECIFIED WHETHER RECURRENT (CMS-HCC): ICD-10-CM

## 2024-09-17 PROCEDURE — 99349 HOME/RES VST EST MOD MDM 40: CPT | Performed by: EMERGENCY MEDICINE

## 2024-09-17 PROCEDURE — 99497 ADVNCD CARE PLAN 30 MIN: CPT | Performed by: EMERGENCY MEDICINE

## 2024-09-17 PROCEDURE — G0439 PPPS, SUBSEQ VISIT: HCPCS | Performed by: EMERGENCY MEDICINE

## 2024-09-17 NOTE — LETTER
Patient: Karolina Rice  : 1950    Encounter Date: 2024    Karolina Rice   73 y.o.  female  @location@         Patient is being seen for subsequent Medicare wellness and/or physical    Past Medical, Surgical and Family History: reviewed and updated in chart.   Medications and Supplements: Review of all medications by a prescribing practitioner or clinical pharmacist (such as prescriptions, OTCs, herbal therapies and supplements) documented in the medical record.    No, the patient is not using opioids.   Patient Self Assessment of Health Status: fair.   Tobacco use: Non-User The patient is a former cigarette smoker.   Alcohol use: As noted in social history   Illicit drug use: As noted in social history   Current diet: well balanced diet.   Exercise Frequency: the patient does not exercise.   Depression/Suicide Screening:  .   During the past 2 weeks, the patient has not felt down, depressed or hopeless.    During the past 2 weeks, the patient has not felt little interest or pleasure in doing things.    Hearing Impairment: Patient has slight hearing impairment.   Cognitive Impairment: Cognitive impairment was observed.      Bathing: dependent.   Dressing: dependent.   Walking: dependent.   Toileting: dependent.   Feeding: dependent.   Personal Hygiene: dependent.   Managing Finances: dependent.   Shopping: dependent.   Managing Medications: dependent.   Housework / Basic Home Maintenance: dependent.   Handling Transportation: dependent.   Preparing Meals: dependent.   Using the Telephone/ Communication Devices: dependent.    Falls Risk Screening:. Has not fallen in the last 6 months.   Home safety risk factors: none.   Advance directives:. Advanced Care Planning discussed and documented advance care plan or surrogate decision maker documented in the medical record.   A Conversation about Advance directives of at least 16 minutes has occurred. Actual time spent: 20   Topics discussed: Code status per  nursing documentation filed with facility.              Assessment and Plan:      Advanced dementia  History of alcohol abuse in the past  Anxiety and depression  Basal cell carcinoma of multiple sites   Allergic rhinitis      Currently her mental issues are controlled with Seroquel 12.5 mg orally every 8 hours as needed   Haldol 2 mg as needed   Takes melatonin 6 mg at bed time to help her sleep      This patient was seen for my regular monthly visit, nursing evaluations and nursing notes were reviewed, interim events are reviewed, interim concerns and messages were reviewed as we have communicated with nursing staff.  Any issues with the falls, skin care impairment, declining physical condition are reviewed and noted, diagnosis list were reviewed, list of medications were reviewed, living will related issues were reviewed, overall patient has been doing well, any declining in patient's condition or any change in patient's condition needs to be notified to physician promptly, discussed with nursing staff, if needed would communicate with family.  Patient stays confined here at the facility for long-term care, there are always concerns about long-term care related issues and concerns.  Nursing staff is trying their best to keep patient safe, all sort of measures has been taken to keep patient safe and comfortable.           source of history: Nurse, Medical personnel, Medical record, Patient.  History limitation: None.     HPI:     Patient is unable to give any detailed history and therefore history is obtained from the chart  No acute complaints voiced by the patient or acute concerns raised by nursing     Patient used to reside by herself in her home.  She was found wandering about couple of times  She was seen by psychiatry evaluated to be recommended that she be placed in a memory care unit        Past history taken from chart includes  Basal cell carcinoma of the shoulder and skin of the forehead  Allergic  rhinitis  Anxiety and depression        Past surgical history-  Excision of neuroma from digits of the toes  Hernia repair surgery  Ovarian cystectomy  Liposuction  Shoulder surgery     Social history-  Ex-smoker-15-pack-year history approximately  Used to drink 10 drinks a week of alcohol.  Unclear as to when she stopped drinking        Physical Exam:  Vital signs as per nursing/MA documentation were reviewed  General appearance: Alert and in no acute distress  HEENT: Normal Inspection  Neck - Normal Inspection  Respiratory : No respiratory distress. Lungs are clear   Cardiovascular: heart rate normal. No gallop  Back - normal inspection  Skin inspection:Warm  Musculoskeletal : No deformities  Neuro : Limited exam. Baseline     ROS: Review of symptoms is negative except for what is mentioned in HPI              Medical History        Past Medical History:   Diagnosis Date   • Personal history of other malignant neoplasm of skin 01/10/2017     History of basal cell carcinoma            Surgical History         Past Surgical History:   Procedure Laterality Date   • COLONOSCOPY   01/10/2017     Complete Colonoscopy   • HERNIA REPAIR   01/10/2017     Hernia Repair   • OTHER SURGICAL HISTORY   01/10/2017     Excision Of Neuroma   • OTHER SURGICAL HISTORY   01/10/2017     Ovarian Cystectomy   • SHOULDER SURGERY   01/10/2017     Shoulder Surgery   • SKIN CANCER EXCISION   01/10/2017     Mohs Micrographic Surgery Face   • TOTAL KNEE ARTHROPLASTY   01/10/2017     Total Knee Arthroplasty               Charting was completed using voice recognition technology and may include unintended errors.     Discussed with patient/family, RN, and NP.      Electronically Signed By: Edmar Louise MD   9/24/24  4:49 PM

## 2024-09-19 NOTE — PROGRESS NOTES
Karolina Rice   73 y.o.  female  @location@         Patient is being seen for subsequent Medicare wellness and/or physical    Past Medical, Surgical and Family History: reviewed and updated in chart.   Medications and Supplements: Review of all medications by a prescribing practitioner or clinical pharmacist (such as prescriptions, OTCs, herbal therapies and supplements) documented in the medical record.    No, the patient is not using opioids.   Patient Self Assessment of Health Status: fair.   Tobacco use: Non-User The patient is a former cigarette smoker.   Alcohol use: As noted in social history   Illicit drug use: As noted in social history   Current diet: well balanced diet.   Exercise Frequency: the patient does not exercise.   Depression/Suicide Screening:  .   During the past 2 weeks, the patient has not felt down, depressed or hopeless.    During the past 2 weeks, the patient has not felt little interest or pleasure in doing things.    Hearing Impairment: Patient has slight hearing impairment.   Cognitive Impairment: Cognitive impairment was observed.      Bathing: dependent.   Dressing: dependent.   Walking: dependent.   Toileting: dependent.   Feeding: dependent.   Personal Hygiene: dependent.   Managing Finances: dependent.   Shopping: dependent.   Managing Medications: dependent.   Housework / Basic Home Maintenance: dependent.   Handling Transportation: dependent.   Preparing Meals: dependent.   Using the Telephone/ Communication Devices: dependent.    Falls Risk Screening:. Has not fallen in the last 6 months.   Home safety risk factors: none.   Advance directives:. Advanced Care Planning discussed and documented advance care plan or surrogate decision maker documented in the medical record.   A Conversation about Advance directives of at least 16 minutes has occurred. Actual time spent: 20   Topics discussed: Code status per nursing documentation filed with facility.              Assessment and  Plan:      Advanced dementia  History of alcohol abuse in the past  Anxiety and depression  Basal cell carcinoma of multiple sites   Allergic rhinitis      Currently her mental issues are controlled with Seroquel 12.5 mg orally every 8 hours as needed   Haldol 2 mg as needed   Takes melatonin 6 mg at bed time to help her sleep      This patient was seen for my regular monthly visit, nursing evaluations and nursing notes were reviewed, interim events are reviewed, interim concerns and messages were reviewed as we have communicated with nursing staff.  Any issues with the falls, skin care impairment, declining physical condition are reviewed and noted, diagnosis list were reviewed, list of medications were reviewed, living will related issues were reviewed, overall patient has been doing well, any declining in patient's condition or any change in patient's condition needs to be notified to physician promptly, discussed with nursing staff, if needed would communicate with family.  Patient stays confined here at the facility for long-term care, there are always concerns about long-term care related issues and concerns.  Nursing staff is trying their best to keep patient safe, all sort of measures has been taken to keep patient safe and comfortable.           source of history: Nurse, Medical personnel, Medical record, Patient.  History limitation: None.     HPI:     Patient is unable to give any detailed history and therefore history is obtained from the chart  No acute complaints voiced by the patient or acute concerns raised by nursing     Patient used to reside by herself in her home.  She was found wandering about couple of times  She was seen by psychiatry evaluated to be recommended that she be placed in a memory care unit        Past history taken from chart includes  Basal cell carcinoma of the shoulder and skin of the forehead  Allergic rhinitis  Anxiety and depression        Past surgical history-  Excision of  neuroma from digits of the toes  Hernia repair surgery  Ovarian cystectomy  Liposuction  Shoulder surgery     Social history-  Ex-smoker-15-pack-year history approximately  Used to drink 10 drinks a week of alcohol.  Unclear as to when she stopped drinking        Physical Exam:  Vital signs as per nursing/MA documentation were reviewed  General appearance: Alert and in no acute distress  HEENT: Normal Inspection  Neck - Normal Inspection  Respiratory : No respiratory distress. Lungs are clear   Cardiovascular: heart rate normal. No gallop  Back - normal inspection  Skin inspection:Warm  Musculoskeletal : No deformities  Neuro : Limited exam. Baseline     ROS: Review of symptoms is negative except for what is mentioned in HPI              Medical History        Past Medical History:   Diagnosis Date    Personal history of other malignant neoplasm of skin 01/10/2017     History of basal cell carcinoma            Surgical History         Past Surgical History:   Procedure Laterality Date    COLONOSCOPY   01/10/2017     Complete Colonoscopy    HERNIA REPAIR   01/10/2017     Hernia Repair    OTHER SURGICAL HISTORY   01/10/2017     Excision Of Neuroma    OTHER SURGICAL HISTORY   01/10/2017     Ovarian Cystectomy    SHOULDER SURGERY   01/10/2017     Shoulder Surgery    SKIN CANCER EXCISION   01/10/2017     Mohs Micrographic Surgery Face    TOTAL KNEE ARTHROPLASTY   01/10/2017     Total Knee Arthroplasty               Charting was completed using voice recognition technology and may include unintended errors.     Discussed with patient/family, RN, and NP.

## 2024-10-15 ENCOUNTER — NURSING HOME VISIT (OUTPATIENT)
Dept: POST ACUTE CARE | Facility: EXTERNAL LOCATION | Age: 74
End: 2024-10-15
Payer: MEDICARE

## 2024-10-15 DIAGNOSIS — F10.11 HISTORY OF ALCOHOL ABUSE: Primary | ICD-10-CM

## 2024-10-15 DIAGNOSIS — F03.918 AGGRESSIVE BEHAVIOR DUE TO DEMENTIA (MULTI): ICD-10-CM

## 2024-10-15 DIAGNOSIS — F02.C0: ICD-10-CM

## 2024-10-15 DIAGNOSIS — C44.81 BASAL CELL CARCINOMA (BCC) OF OVERLAPPING SITES OF SKIN: ICD-10-CM

## 2024-10-15 PROCEDURE — 99348 HOME/RES VST EST LOW MDM 30: CPT | Performed by: EMERGENCY MEDICINE

## 2024-10-15 NOTE — LETTER
Patient: Karolina Rice  : 1950    Encounter Date: 10/15/2024    Karolina Rice   73 y.o.  female  @location@                 Assessment and Plan:      Advanced dementia  History of alcohol abuse in the past  Anxiety and depression  Basal cell carcinoma of multiple sites   Allergic rhinitis      Currently her mental issues are controlled with Seroquel 12.5 mg orally every 8 hours as needed   Haldol 2 mg as needed   Takes melatonin 6 mg at bed time to help her sleep      -Fall prevention    -Cognitive engagement    -Monitor and treat blood pressure    -Aggressive decubitus ulcer prevention.    -Bowel and bladder care    -Optimal nutrition and supplementation as needed    -GI  and DVT prophylaxis    -Symptom control    -Ambulation as tolerated    -Will follow          source of history: Nurse, Medical personnel, Medical record, Patient.  History limitation: None.     HPI:     Patient is unable to give any detailed history and therefore history is obtained from the chart  No acute complaints voiced by the patient or acute concerns raised by nursing     Patient used to reside by herself in her home.  She was found wandering about couple of times  She was seen by psychiatry evaluated to be recommended that she be placed in a memory care unit        Past history taken from chart includes  Basal cell carcinoma of the shoulder and skin of the forehead  Allergic rhinitis  Anxiety and depression        Past surgical history-  Excision of neuroma from digits of the toes  Hernia repair surgery  Ovarian cystectomy  Liposuction  Shoulder surgery     Social history-  Ex-smoker-15-pack-year history approximately  Used to drink 10 drinks a week of alcohol.  Unclear as to when she stopped drinking        Physical Exam:  Vital signs as per nursing/MA documentation were reviewed  General appearance: Alert and in no acute distress  HEENT: Normal Inspection  Neck - Normal Inspection  Respiratory : No respiratory distress. Lungs  are clear   Cardiovascular: heart rate normal. No gallop  Back - normal inspection  Skin inspection:Warm  Musculoskeletal : No deformities  Neuro : Limited exam. Baseline     ROS: Review of symptoms is negative except for what is mentioned in HPI              Medical History        Past Medical History:   Diagnosis Date   • Personal history of other malignant neoplasm of skin 01/10/2017     History of basal cell carcinoma            Surgical History         Past Surgical History:   Procedure Laterality Date   • COLONOSCOPY   01/10/2017     Complete Colonoscopy   • HERNIA REPAIR   01/10/2017     Hernia Repair   • OTHER SURGICAL HISTORY   01/10/2017     Excision Of Neuroma   • OTHER SURGICAL HISTORY   01/10/2017     Ovarian Cystectomy   • SHOULDER SURGERY   01/10/2017     Shoulder Surgery   • SKIN CANCER EXCISION   01/10/2017     Mohs Micrographic Surgery Face   • TOTAL KNEE ARTHROPLASTY   01/10/2017     Total Knee Arthroplasty               Charting was completed using voice recognition technology and may include unintended errors.     Discussed with patient/family, RN, and NP.      Electronically Signed By: Edmar Louise MD   10/27/24  5:36 PM

## 2024-10-21 NOTE — PROGRESS NOTES
Karolina Rice   73 y.o.  female  @location@                 Assessment and Plan:      Advanced dementia  History of alcohol abuse in the past  Anxiety and depression  Basal cell carcinoma of multiple sites   Allergic rhinitis      Currently her mental issues are controlled with Seroquel 12.5 mg orally every 8 hours as needed   Haldol 2 mg as needed   Takes melatonin 6 mg at bed time to help her sleep      -Fall prevention    -Cognitive engagement    -Monitor and treat blood pressure    -Aggressive decubitus ulcer prevention.    -Bowel and bladder care    -Optimal nutrition and supplementation as needed    -GI  and DVT prophylaxis    -Symptom control    -Ambulation as tolerated    -Will follow          source of history: Nurse, Medical personnel, Medical record, Patient.  History limitation: None.     HPI:     Patient is unable to give any detailed history and therefore history is obtained from the chart  No acute complaints voiced by the patient or acute concerns raised by nursing     Patient used to reside by herself in her home.  She was found wandering about couple of times  She was seen by psychiatry evaluated to be recommended that she be placed in a memory care unit        Past history taken from chart includes  Basal cell carcinoma of the shoulder and skin of the forehead  Allergic rhinitis  Anxiety and depression        Past surgical history-  Excision of neuroma from digits of the toes  Hernia repair surgery  Ovarian cystectomy  Liposuction  Shoulder surgery     Social history-  Ex-smoker-15-pack-year history approximately  Used to drink 10 drinks a week of alcohol.  Unclear as to when she stopped drinking        Physical Exam:  Vital signs as per nursing/MA documentation were reviewed  General appearance: Alert and in no acute distress  HEENT: Normal Inspection  Neck - Normal Inspection  Respiratory : No respiratory distress. Lungs are clear   Cardiovascular: heart rate normal. No gallop  Back - normal  inspection  Skin inspection:Warm  Musculoskeletal : No deformities  Neuro : Limited exam. Baseline     ROS: Review of symptoms is negative except for what is mentioned in HPI              Medical History        Past Medical History:   Diagnosis Date    Personal history of other malignant neoplasm of skin 01/10/2017     History of basal cell carcinoma            Surgical History         Past Surgical History:   Procedure Laterality Date    COLONOSCOPY   01/10/2017     Complete Colonoscopy    HERNIA REPAIR   01/10/2017     Hernia Repair    OTHER SURGICAL HISTORY   01/10/2017     Excision Of Neuroma    OTHER SURGICAL HISTORY   01/10/2017     Ovarian Cystectomy    SHOULDER SURGERY   01/10/2017     Shoulder Surgery    SKIN CANCER EXCISION   01/10/2017     Mohs Micrographic Surgery Face    TOTAL KNEE ARTHROPLASTY   01/10/2017     Total Knee Arthroplasty               Charting was completed using voice recognition technology and may include unintended errors.     Discussed with patient/family, RN, and NP.

## 2024-11-07 ENCOUNTER — NURSING HOME VISIT (OUTPATIENT)
Dept: POST ACUTE CARE | Facility: EXTERNAL LOCATION | Age: 74
End: 2024-11-07
Payer: MEDICARE

## 2024-11-07 DIAGNOSIS — F32.4 MAJOR DEPRESSIVE DISORDER IN PARTIAL REMISSION, UNSPECIFIED WHETHER RECURRENT (CMS-HCC): ICD-10-CM

## 2024-11-07 DIAGNOSIS — C44.81 BASAL CELL CARCINOMA (BCC) OF OVERLAPPING SITES OF SKIN: Primary | ICD-10-CM

## 2024-11-07 DIAGNOSIS — F02.C0: ICD-10-CM

## 2024-11-07 DIAGNOSIS — F10.11 HISTORY OF ALCOHOL ABUSE: ICD-10-CM

## 2024-11-07 PROCEDURE — 99349 HOME/RES VST EST MOD MDM 40: CPT | Performed by: EMERGENCY MEDICINE

## 2024-11-07 NOTE — LETTER
Patient: Karolina Rice  : 1950    Encounter Date: 2024    Karolina Rice   73 y.o.  female  @location@                 Assessment and Plan:      Advanced dementia  History of alcohol abuse in the past  Anxiety and depression  Basal cell carcinoma of multiple sites   Allergic rhinitis      Currently her mental issues are controlled with Seroquel 12.5 mg orally every 8 hours as needed   Haldol 2 mg as needed   Takes melatonin 6 mg at bed time to help her sleep      Provide safe environment for the patient    Continue current medication regimen    OT PT and speech therapy    Bowel and bladder,skin care    Nutritional support    Monitor and treat blood glucose    GI  and DVT prophylaxis    PRN medications    Periodic lab work    Regular Follow up            source of history: Nurse, Medical personnel, Medical record, Patient.  History limitation: None.     HPI:     Patient is unable to give any detailed history and therefore history is obtained from the chart  No acute complaints voiced by the patient or acute concerns raised by nursing     Patient used to reside by herself in her home.  She was found wandering about couple of times  She was seen by psychiatry evaluated to be recommended that she be placed in a memory care unit        Past history taken from chart includes  Basal cell carcinoma of the shoulder and skin of the forehead  Allergic rhinitis  Anxiety and depression        Past surgical history-  Excision of neuroma from digits of the toes  Hernia repair surgery  Ovarian cystectomy  Liposuction  Shoulder surgery     Social history-  Ex-smoker-15-pack-year history approximately  Used to drink 10 drinks a week of alcohol.  Unclear as to when she stopped drinking        Physical Exam:  Vital signs as per nursing/MA documentation were reviewed  General appearance: Alert and in no acute distress  HEENT: Normal Inspection  Neck - Normal Inspection  Respiratory : No respiratory distress. Lungs are  clear   Cardiovascular: heart rate normal. No gallop  Back - normal inspection  Skin inspection:Warm  Musculoskeletal : No deformities  Neuro : Limited exam. Baseline     ROS: Review of symptoms is negative except for what is mentioned in HPI              Medical History        Past Medical History:   Diagnosis Date   • Personal history of other malignant neoplasm of skin 01/10/2017     History of basal cell carcinoma            Surgical History         Past Surgical History:   Procedure Laterality Date   • COLONOSCOPY   01/10/2017     Complete Colonoscopy   • HERNIA REPAIR   01/10/2017     Hernia Repair   • OTHER SURGICAL HISTORY   01/10/2017     Excision Of Neuroma   • OTHER SURGICAL HISTORY   01/10/2017     Ovarian Cystectomy   • SHOULDER SURGERY   01/10/2017     Shoulder Surgery   • SKIN CANCER EXCISION   01/10/2017     Mohs Micrographic Surgery Face   • TOTAL KNEE ARTHROPLASTY   01/10/2017     Total Knee Arthroplasty               Charting was completed using voice recognition technology and may include unintended errors.     Discussed with patient/family, RN, and NP.      Electronically Signed By: Edmar Louise MD   11/24/24  7:09 AM

## 2024-11-22 NOTE — PROGRESS NOTES
Karolina Rice   73 y.o.  female  @location@                 Assessment and Plan:      Advanced dementia  History of alcohol abuse in the past  Anxiety and depression  Basal cell carcinoma of multiple sites   Allergic rhinitis      Currently her mental issues are controlled with Seroquel 12.5 mg orally every 8 hours as needed   Haldol 2 mg as needed   Takes melatonin 6 mg at bed time to help her sleep      Provide safe environment for the patient    Continue current medication regimen    OT PT and speech therapy    Bowel and bladder,skin care    Nutritional support    Monitor and treat blood glucose    GI  and DVT prophylaxis    PRN medications    Periodic lab work    Regular Follow up            source of history: Nurse, Medical personnel, Medical record, Patient.  History limitation: None.     HPI:     Patient is unable to give any detailed history and therefore history is obtained from the chart  No acute complaints voiced by the patient or acute concerns raised by nursing     Patient used to reside by herself in her home.  She was found wandering about couple of times  She was seen by psychiatry evaluated to be recommended that she be placed in a memory care unit        Past history taken from chart includes  Basal cell carcinoma of the shoulder and skin of the forehead  Allergic rhinitis  Anxiety and depression        Past surgical history-  Excision of neuroma from digits of the toes  Hernia repair surgery  Ovarian cystectomy  Liposuction  Shoulder surgery     Social history-  Ex-smoker-15-pack-year history approximately  Used to drink 10 drinks a week of alcohol.  Unclear as to when she stopped drinking        Physical Exam:  Vital signs as per nursing/MA documentation were reviewed  General appearance: Alert and in no acute distress  HEENT: Normal Inspection  Neck - Normal Inspection  Respiratory : No respiratory distress. Lungs are clear   Cardiovascular: heart rate normal. No gallop  Back - normal  inspection  Skin inspection:Warm  Musculoskeletal : No deformities  Neuro : Limited exam. Baseline     ROS: Review of symptoms is negative except for what is mentioned in HPI              Medical History        Past Medical History:   Diagnosis Date    Personal history of other malignant neoplasm of skin 01/10/2017     History of basal cell carcinoma            Surgical History         Past Surgical History:   Procedure Laterality Date    COLONOSCOPY   01/10/2017     Complete Colonoscopy    HERNIA REPAIR   01/10/2017     Hernia Repair    OTHER SURGICAL HISTORY   01/10/2017     Excision Of Neuroma    OTHER SURGICAL HISTORY   01/10/2017     Ovarian Cystectomy    SHOULDER SURGERY   01/10/2017     Shoulder Surgery    SKIN CANCER EXCISION   01/10/2017     Mohs Micrographic Surgery Face    TOTAL KNEE ARTHROPLASTY   01/10/2017     Total Knee Arthroplasty               Charting was completed using voice recognition technology and may include unintended errors.     Discussed with patient/family, RN, and NP.

## 2025-01-16 ENCOUNTER — NURSING HOME VISIT (OUTPATIENT)
Dept: POST ACUTE CARE | Facility: EXTERNAL LOCATION | Age: 75
End: 2025-01-16
Payer: MEDICARE

## 2025-01-16 DIAGNOSIS — F02.C0: Primary | ICD-10-CM

## 2025-01-16 DIAGNOSIS — F32.4 MAJOR DEPRESSIVE DISORDER IN PARTIAL REMISSION, UNSPECIFIED WHETHER RECURRENT (CMS-HCC): ICD-10-CM

## 2025-01-16 DIAGNOSIS — F10.11 HISTORY OF ALCOHOL ABUSE: ICD-10-CM

## 2025-01-16 DIAGNOSIS — C44.81 BASAL CELL CARCINOMA (BCC) OF OVERLAPPING SITES OF SKIN: ICD-10-CM

## 2025-01-16 PROCEDURE — 99349 HOME/RES VST EST MOD MDM 40: CPT | Performed by: EMERGENCY MEDICINE

## 2025-01-16 NOTE — LETTER
Patient: Karolina Rice  : 1950    Encounter Date: 2025    Karolina Rice   73 y.o.  female  @location@                 Assessment and Plan:      Advanced dementia  History of alcohol abuse in the past  Anxiety and depression  Basal cell carcinoma of multiple sites   Allergic rhinitis      Currently her mental issues are controlled with Seroquel 12.5 mg orally every 8 hours as needed   Haldol 2 mg as needed   Takes melatonin 6 mg at bed time to help her sleep      Rx list reviewed.   PT and OT evaluation is in the process.   Routine safety measures, fall precautions, risk modification and alarm placement if needed for prevention of falls.   Skin care precautions, prevention of pressures sores at pressure points assessed.   Pt needs to be monitored frequently by nursing staff particularly at night time.   Any confusion, agitation or behavioural disturbance needs to be attended, as per home policy   rapid covid Ag assay need to be done, notify if positive.   If needed appropriate measures to be taken for alarm placements and assisted devices, pt was told not to get up and ambulate at night unless help and assist available at bedside,   labs will be done as per our routine protocol.   PO intake need to be monitored if consuming po.       Charting is done using voice recognition software and may contain errors which have not been completely corrected                source of history: Nurse, Medical personnel, Medical record, Patient.  History limitation: None.     HPI:     Patient is unable to give any detailed history and therefore history is obtained from the chart  No acute complaints voiced by the patient or acute concerns raised by nursing     Patient used to reside by herself in her home.  She was found wandering about couple of times  She was seen by psychiatry evaluated to be recommended that she be placed in a memory care unit        Past history taken from chart includes  Basal cell carcinoma of  the shoulder and skin of the forehead  Allergic rhinitis  Anxiety and depression        Past surgical history-  Excision of neuroma from digits of the toes  Hernia repair surgery  Ovarian cystectomy  Liposuction  Shoulder surgery     Social history-  Ex-smoker-15-pack-year history approximately  Used to drink 10 drinks a week of alcohol.  Unclear as to when she stopped drinking        Physical Exam:  Vital signs as per nursing/MA documentation were reviewed  General appearance: Alert and in no acute distress  HEENT: Normal Inspection  Neck - Normal Inspection  Respiratory : No respiratory distress. Lungs are clear   Cardiovascular: heart rate normal. No gallop  Back - normal inspection  Skin inspection:Warm  Musculoskeletal : No deformities  Neuro : Limited exam. Baseline     ROS: Review of symptoms is negative except for what is mentioned in HPI              Medical History        Past Medical History:   Diagnosis Date   • Personal history of other malignant neoplasm of skin 01/10/2017     History of basal cell carcinoma            Surgical History         Past Surgical History:   Procedure Laterality Date   • COLONOSCOPY   01/10/2017     Complete Colonoscopy   • HERNIA REPAIR   01/10/2017     Hernia Repair   • OTHER SURGICAL HISTORY   01/10/2017     Excision Of Neuroma   • OTHER SURGICAL HISTORY   01/10/2017     Ovarian Cystectomy   • SHOULDER SURGERY   01/10/2017     Shoulder Surgery   • SKIN CANCER EXCISION   01/10/2017     Mohs Micrographic Surgery Face   • TOTAL KNEE ARTHROPLASTY   01/10/2017     Total Knee Arthroplasty               Charting was completed using voice recognition technology and may include unintended errors.     Discussed with patient/family, RN, and NP.      Electronically Signed By: Edmar Louise MD   1/28/25  5:57 PM

## 2025-01-22 NOTE — PROGRESS NOTES
Karolina Rice   73 y.o.  female  @location@                 Assessment and Plan:      Advanced dementia  History of alcohol abuse in the past  Anxiety and depression  Basal cell carcinoma of multiple sites   Allergic rhinitis      Currently her mental issues are controlled with Seroquel 12.5 mg orally every 8 hours as needed   Haldol 2 mg as needed   Takes melatonin 6 mg at bed time to help her sleep      Rx list reviewed.   PT and OT evaluation is in the process.   Routine safety measures, fall precautions, risk modification and alarm placement if needed for prevention of falls.   Skin care precautions, prevention of pressures sores at pressure points assessed.   Pt needs to be monitored frequently by nursing staff particularly at night time.   Any confusion, agitation or behavioural disturbance needs to be attended, as per home policy   rapid covid Ag assay need to be done, notify if positive.   If needed appropriate measures to be taken for alarm placements and assisted devices, pt was told not to get up and ambulate at night unless help and assist available at bedside,   labs will be done as per our routine protocol.   PO intake need to be monitored if consuming po.       Charting is done using voice recognition software and may contain errors which have not been completely corrected                source of history: Nurse, Medical personnel, Medical record, Patient.  History limitation: None.     HPI:     Patient is unable to give any detailed history and therefore history is obtained from the chart  No acute complaints voiced by the patient or acute concerns raised by nursing     Patient used to reside by herself in her home.  She was found wandering about couple of times  She was seen by psychiatry evaluated to be recommended that she be placed in a memory care unit        Past history taken from chart includes  Basal cell carcinoma of the shoulder and skin of the forehead  Allergic rhinitis  Anxiety and  depression        Past surgical history-  Excision of neuroma from digits of the toes  Hernia repair surgery  Ovarian cystectomy  Liposuction  Shoulder surgery     Social history-  Ex-smoker-15-pack-year history approximately  Used to drink 10 drinks a week of alcohol.  Unclear as to when she stopped drinking        Physical Exam:  Vital signs as per nursing/MA documentation were reviewed  General appearance: Alert and in no acute distress  HEENT: Normal Inspection  Neck - Normal Inspection  Respiratory : No respiratory distress. Lungs are clear   Cardiovascular: heart rate normal. No gallop  Back - normal inspection  Skin inspection:Warm  Musculoskeletal : No deformities  Neuro : Limited exam. Baseline     ROS: Review of symptoms is negative except for what is mentioned in HPI              Medical History        Past Medical History:   Diagnosis Date    Personal history of other malignant neoplasm of skin 01/10/2017     History of basal cell carcinoma            Surgical History         Past Surgical History:   Procedure Laterality Date    COLONOSCOPY   01/10/2017     Complete Colonoscopy    HERNIA REPAIR   01/10/2017     Hernia Repair    OTHER SURGICAL HISTORY   01/10/2017     Excision Of Neuroma    OTHER SURGICAL HISTORY   01/10/2017     Ovarian Cystectomy    SHOULDER SURGERY   01/10/2017     Shoulder Surgery    SKIN CANCER EXCISION   01/10/2017     Mohs Micrographic Surgery Face    TOTAL KNEE ARTHROPLASTY   01/10/2017     Total Knee Arthroplasty               Charting was completed using voice recognition technology and may include unintended errors.     Discussed with patient/family, RN, and NP.

## 2025-02-13 ENCOUNTER — NURSING HOME VISIT (OUTPATIENT)
Dept: POST ACUTE CARE | Facility: EXTERNAL LOCATION | Age: 75
End: 2025-02-13
Payer: MEDICARE

## 2025-02-13 DIAGNOSIS — F03.918 AGGRESSIVE BEHAVIOR DUE TO DEMENTIA (MULTI): ICD-10-CM

## 2025-02-13 DIAGNOSIS — F10.11 HISTORY OF ALCOHOL ABUSE: ICD-10-CM

## 2025-02-13 DIAGNOSIS — C44.81 BASAL CELL CARCINOMA (BCC) OF OVERLAPPING SITES OF SKIN: Primary | ICD-10-CM

## 2025-02-13 DIAGNOSIS — F32.4 MAJOR DEPRESSIVE DISORDER IN PARTIAL REMISSION, UNSPECIFIED WHETHER RECURRENT (CMS-HCC): ICD-10-CM

## 2025-02-13 NOTE — LETTER
Patient: Karolina Rice  : 1950    Encounter Date: 2025    Karolina Rice   73 y.o.  female  @location@                 Assessment and Plan:      Advanced dementia  History of alcohol abuse in the past  Anxiety and depression  Basal cell carcinoma of multiple sites   Allergic rhinitis      Currently her mental issues are controlled with Seroquel 12.5 mg orally every 8 hours as needed   Haldol 2 mg as needed   Takes melatonin 6 mg at bed time to help her sleep      1. medications are reviewed      2. Continue with rehabilitative, supportive, and or restorative care as ordered and as the patient tolerates     3. Laboratory evaluations will be monitored on an ongoing as needed basis     4. Medications have been cross-referenced with the patient's diagnoses list, and medications reductions have been considered and/or implemented.     5. Pharmacy recommendations are addressed on an ongoing as needed basis.     6. Controlled substances have been electronically scripted every 60 days for opiates and others of similar schedule, and every 6 months for sedative/hypnotics and others of similar schedule.     7. Nursing has been queried about any potential adverse events that need to be reported to me.    Salient information and adjustment of care plan pertaining to this individual patient interaction today are the following:      A. We will continue with restorative and supportive care as the patient tolerates    B. Laboratory examinations will continue to be drawn on an ongoing as-needed basis. The patient's weight needs to be monitored and if needed we may need to institute appetite stimulating medication    C. The patient's long term prognosis is guarded    Charting is done using voice recognition software and may contain errors which may not have been completely corrected                  source of history: Nurse, Medical personnel, Medical record, Patient.  History limitation: None.     HPI:     Patient is  unable to give any detailed history and therefore history is obtained from the chart  No acute complaints voiced by the patient or acute concerns raised by nursing     Patient used to reside by herself in her home.  She was found wandering about couple of times  She was seen by psychiatry evaluated to be recommended that she be placed in a memory care unit        Past history taken from chart includes  Basal cell carcinoma of the shoulder and skin of the forehead  Allergic rhinitis  Anxiety and depression        Past surgical history-  Excision of neuroma from digits of the toes  Hernia repair surgery  Ovarian cystectomy  Liposuction  Shoulder surgery     Social history-  Ex-smoker-15-pack-year history approximately  Used to drink 10 drinks a week of alcohol.  Unclear as to when she stopped drinking        Physical Exam:  Vital signs as per nursing/MA documentation were reviewed  General appearance: Alert and in no acute distress  HEENT: Normal Inspection  Neck - Normal Inspection  Respiratory : No respiratory distress. Lungs are clear   Cardiovascular: heart rate normal. No gallop  Back - normal inspection  Skin inspection:Warm  Musculoskeletal : No deformities  Neuro : Limited exam. Baseline     ROS: Review of symptoms is negative except for what is mentioned in HPI              Medical History        Past Medical History:   Diagnosis Date   • Personal history of other malignant neoplasm of skin 01/10/2017     History of basal cell carcinoma            Surgical History         Past Surgical History:   Procedure Laterality Date   • COLONOSCOPY   01/10/2017     Complete Colonoscopy   • HERNIA REPAIR   01/10/2017     Hernia Repair   • OTHER SURGICAL HISTORY   01/10/2017     Excision Of Neuroma   • OTHER SURGICAL HISTORY   01/10/2017     Ovarian Cystectomy   • SHOULDER SURGERY   01/10/2017     Shoulder Surgery   • SKIN CANCER EXCISION   01/10/2017     Mohs Micrographic Surgery Face   • TOTAL KNEE ARTHROPLASTY    01/10/2017     Total Knee Arthroplasty               Charting was completed using voice recognition technology and may include unintended errors.     Discussed with patient/family, RN, and NP.      Electronically Signed By: Edmar Louise MD   3/1/25  7:50 AM

## 2025-03-06 ENCOUNTER — HOME VISIT (OUTPATIENT)
Dept: POST ACUTE CARE | Facility: EXTERNAL LOCATION | Age: 75
End: 2025-03-06
Payer: MEDICARE

## 2025-03-06 DIAGNOSIS — F02.C0: ICD-10-CM

## 2025-03-06 DIAGNOSIS — F03.918 AGGRESSIVE BEHAVIOR DUE TO DEMENTIA (MULTI): ICD-10-CM

## 2025-03-06 DIAGNOSIS — C44.81 BASAL CELL CARCINOMA (BCC) OF OVERLAPPING SITES OF SKIN: ICD-10-CM

## 2025-03-06 DIAGNOSIS — F32.4 MAJOR DEPRESSIVE DISORDER IN PARTIAL REMISSION, UNSPECIFIED WHETHER RECURRENT (CMS-HCC): Primary | ICD-10-CM

## 2025-03-06 PROCEDURE — 99349 HOME/RES VST EST MOD MDM 40: CPT | Performed by: EMERGENCY MEDICINE

## 2025-04-15 ENCOUNTER — HOME VISIT (OUTPATIENT)
Dept: POST ACUTE CARE | Facility: EXTERNAL LOCATION | Age: 75
End: 2025-04-15
Payer: MEDICARE

## 2025-04-15 DIAGNOSIS — F03.90 ADVANCING DEMENTIA (MULTI): Primary | ICD-10-CM

## 2025-04-15 DIAGNOSIS — F32.4 MAJOR DEPRESSIVE DISORDER IN PARTIAL REMISSION, UNSPECIFIED WHETHER RECURRENT: ICD-10-CM

## 2025-04-15 DIAGNOSIS — F32.A ANXIETY AND DEPRESSION: ICD-10-CM

## 2025-04-15 DIAGNOSIS — F41.9 ANXIETY AND DEPRESSION: ICD-10-CM

## 2025-04-15 DIAGNOSIS — C44.81 BASAL CELL CARCINOMA (BCC) OF OVERLAPPING SITES OF SKIN: ICD-10-CM

## 2025-04-15 PROCEDURE — 99348 HOME/RES VST EST LOW MDM 30: CPT | Performed by: EMERGENCY MEDICINE

## 2025-04-15 NOTE — PROGRESS NOTES
Karolina Rice   73 y.o.  female  @location@                 Assessment and Plan:      Advanced dementia  History of alcohol abuse in the past  Anxiety and depression  Basal cell carcinoma of multiple sites   Allergic rhinitis      Currently her mental issues are controlled with Seroquel 12.5 mg orally every 8 hours as needed   Haldol 2 mg as needed   Takes melatonin 6 mg at bed time to help her sleep      This patient was seen for my regular monthly visit, nursing evaluations and nursing notes were reviewed, interim events are reviewed, interim concerns and messages were reviewed as we have communicated with nursing staff.  Any issues with the falls, skin care impairment, declining physical condition are reviewed and noted, diagnosis list were reviewed, list of medications were reviewed, living will related issues were reviewed, overall patient has been doing well, any declining in patient's condition or any change in patient's condition needs to be notified to physician promptly, discussed with nursing staff, if needed would communicate with family.  Patient stays confined here at the facility for long-term care, there are always concerns about long-term care related issues and concerns.  Nursing staff is trying their best to keep patient safe, all sort of measures has been taken to keep patient safe and comfortable.   Skin integrity:  Nursing to monitor skin integrity as patient is at risk for pressure injuries.  Wound care per nursing  See Facility notes for measurements/assessments  Turn and reposition Q 2 hours or more  Air mattress and when up in chair cushion reducing device  Dietician to evaluate and recommend:  Nutritional supplement:  Please monitor skin integrity and other pressure areas  Referral to wound clinic if appropriate:     Pt has been seen for follow up visit.  Recent nursing evaluation and notes were reviewed.   Overall, patient is stable despite his/her chronic conditions.      Any decline  or change in condition needs to be communicated with the physician or myself.    Discussion with nursing staff regarding ongoing care and management.  If needed, would communicate with family who are not present at this time.   There are no concerns at this time.  We will continue with the medications noted above.    We will continue to follow the patient here at the facility.      *Please note that nursing facility, outside laboratory agency, and  [facility] do not interface.        source of history: Nurse, Medical personnel, Medical record, Patient.  History limitation: None.     HPI:     Patient is unable to give any detailed history and therefore history is obtained from the chart  No acute complaints voiced by the patient or acute concerns raised by nursing     Patient used to reside by herself in her home.  She was found wandering about couple of times  She was seen by psychiatry evaluated to be recommended that she be placed in a memory care unit        Past history taken from chart includes  Basal cell carcinoma of the shoulder and skin of the forehead  Allergic rhinitis  Anxiety and depression        Past surgical history-  Excision of neuroma from digits of the toes  Hernia repair surgery  Ovarian cystectomy  Liposuction  Shoulder surgery     Social history-  Ex-smoker-15-pack-year history approximately  Used to drink 10 drinks a week of alcohol.  Unclear as to when she stopped drinking        Physical Exam:  Vital signs as per nursing/MA documentation were reviewed  General appearance: Alert and in no acute distress  HEENT: Normal Inspection  Neck - Normal Inspection  Respiratory : No respiratory distress. Lungs are clear   Cardiovascular: heart rate normal. No gallop  Back - normal inspection  Skin inspection:Warm  Musculoskeletal : No deformities  Neuro : Limited exam. Baseline     ROS: Review of symptoms is negative except for what is mentioned in HPI              Medical History        Past Medical  History:   Diagnosis Date    Personal history of other malignant neoplasm of skin 01/10/2017     History of basal cell carcinoma            Surgical History         Past Surgical History:   Procedure Laterality Date    COLONOSCOPY   01/10/2017     Complete Colonoscopy    HERNIA REPAIR   01/10/2017     Hernia Repair    OTHER SURGICAL HISTORY   01/10/2017     Excision Of Neuroma    OTHER SURGICAL HISTORY   01/10/2017     Ovarian Cystectomy    SHOULDER SURGERY   01/10/2017     Shoulder Surgery    SKIN CANCER EXCISION   01/10/2017     Mohs Micrographic Surgery Face    TOTAL KNEE ARTHROPLASTY   01/10/2017     Total Knee Arthroplasty               Charting was completed using voice recognition technology and may include unintended errors.     Discussed with patient/family, RN, and NP.

## 2025-05-06 ENCOUNTER — HOME VISIT (OUTPATIENT)
Dept: POST ACUTE CARE | Facility: EXTERNAL LOCATION | Age: 75
End: 2025-05-06
Payer: MEDICARE

## 2025-05-06 DIAGNOSIS — F41.9 ANXIETY AND DEPRESSION: ICD-10-CM

## 2025-05-06 DIAGNOSIS — F02.C0: ICD-10-CM

## 2025-05-06 DIAGNOSIS — F10.11 HISTORY OF ALCOHOL ABUSE: ICD-10-CM

## 2025-05-06 DIAGNOSIS — F32.4 MAJOR DEPRESSIVE DISORDER IN PARTIAL REMISSION, UNSPECIFIED WHETHER RECURRENT: Primary | ICD-10-CM

## 2025-05-06 DIAGNOSIS — F32.A ANXIETY AND DEPRESSION: ICD-10-CM

## 2025-05-06 PROCEDURE — 99349 HOME/RES VST EST MOD MDM 40: CPT | Performed by: EMERGENCY MEDICINE

## 2025-05-06 NOTE — PROGRESS NOTES
Karolina Rice   73 y.o.  female  @location@                 Assessment and Plan:      Advanced dementia  History of alcohol abuse in the past  Anxiety and depression  Basal cell carcinoma of multiple sites   Allergic rhinitis      Currently her mental issues are controlled with Seroquel 12.5 mg orally every 8 hours as needed   Haldol 2 mg as needed   Takes melatonin 6 mg at bed time to help her sleep      All available hospital and outpatient records have been reviewed. Will continue other current drug therapies as ordered on the continuation of care documents. Physical and Occupational Therapy will assess and treat per POC. Analgesia for identified pain symptoms. Continue the various medicines for bowel and bladder symptoms as well as the vitamins and supplements per hospital instructions. Will assess and provide local care to abnormalities of skin integrity. Drug to drug interaction data as noted by the pharmacy has been reviewed. The patient's condition warrants the continuation of these drugs as prescribed by the medical specialists. Discharge planning will be coordinated through the  department. I have reviewed any advanced directive documentation that is contained in the admission packet as directives indicating whether a surrogate decision maker has been identified.          source of history: Nurse, Medical personnel, Medical record, Patient.  History limitation: None.     HPI:     Patient is unable to give any detailed history and therefore history is obtained from the chart  No acute complaints voiced by the patient or acute concerns raised by nursing     Patient used to reside by herself in her home.  She was found wandering about couple of times  She was seen by psychiatry evaluated to be recommended that she be placed in a memory care unit        Past history taken from chart includes  Basal cell carcinoma of the shoulder and skin of the forehead  Allergic rhinitis  Anxiety and  depression        Past surgical history-  Excision of neuroma from digits of the toes  Hernia repair surgery  Ovarian cystectomy  Liposuction  Shoulder surgery     Social history-  Ex-smoker-15-pack-year history approximately  Used to drink 10 drinks a week of alcohol.  Unclear as to when she stopped drinking        Physical Exam:  Vital signs as per nursing/MA documentation were reviewed  General appearance: Alert and in no acute distress  HEENT: Normal Inspection  Neck - Normal Inspection  Respiratory : No respiratory distress. Lungs are clear   Cardiovascular: heart rate normal. No gallop  Back - normal inspection  Skin inspection:Warm  Musculoskeletal : No deformities  Neuro : Limited exam. Baseline     ROS: Review of symptoms is negative except for what is mentioned in HPI              Medical History        Past Medical History:   Diagnosis Date    Personal history of other malignant neoplasm of skin 01/10/2017     History of basal cell carcinoma            Surgical History         Past Surgical History:   Procedure Laterality Date    COLONOSCOPY   01/10/2017     Complete Colonoscopy    HERNIA REPAIR   01/10/2017     Hernia Repair    OTHER SURGICAL HISTORY   01/10/2017     Excision Of Neuroma    OTHER SURGICAL HISTORY   01/10/2017     Ovarian Cystectomy    SHOULDER SURGERY   01/10/2017     Shoulder Surgery    SKIN CANCER EXCISION   01/10/2017     Mohs Micrographic Surgery Face    TOTAL KNEE ARTHROPLASTY   01/10/2017     Total Knee Arthroplasty               Charting was completed using voice recognition technology and may include unintended errors.     Discussed with patient/family, RN, and NP.

## 2025-06-03 ENCOUNTER — HOME VISIT (OUTPATIENT)
Dept: POST ACUTE CARE | Facility: EXTERNAL LOCATION | Age: 75
End: 2025-06-03
Payer: MEDICARE

## 2025-06-03 DIAGNOSIS — C44.81 BASAL CELL CARCINOMA (BCC) OF OVERLAPPING SITES OF SKIN: ICD-10-CM

## 2025-06-03 DIAGNOSIS — F41.9 ANXIETY AND DEPRESSION: ICD-10-CM

## 2025-06-03 DIAGNOSIS — F32.4 MAJOR DEPRESSIVE DISORDER IN PARTIAL REMISSION, UNSPECIFIED WHETHER RECURRENT: Primary | ICD-10-CM

## 2025-06-03 DIAGNOSIS — F32.A ANXIETY AND DEPRESSION: ICD-10-CM

## 2025-06-03 DIAGNOSIS — F02.C0: ICD-10-CM

## 2025-07-08 ENCOUNTER — HOME VISIT (OUTPATIENT)
Dept: POST ACUTE CARE | Facility: EXTERNAL LOCATION | Age: 75
End: 2025-07-08
Payer: MEDICARE

## 2025-07-08 DIAGNOSIS — F32.4 MAJOR DEPRESSIVE DISORDER IN PARTIAL REMISSION, UNSPECIFIED WHETHER RECURRENT: ICD-10-CM

## 2025-07-08 DIAGNOSIS — F32.A ANXIETY AND DEPRESSION: ICD-10-CM

## 2025-07-08 DIAGNOSIS — F41.9 ANXIETY AND DEPRESSION: ICD-10-CM

## 2025-07-08 DIAGNOSIS — F03.90 ADVANCING DEMENTIA (MULTI): Primary | ICD-10-CM

## 2025-07-08 DIAGNOSIS — F10.11 HISTORY OF ALCOHOL ABUSE: ICD-10-CM

## 2025-07-08 PROCEDURE — 99349 HOME/RES VST EST MOD MDM 40: CPT | Performed by: EMERGENCY MEDICINE

## 2025-07-13 PROBLEM — F03.90 ADVANCING DEMENTIA (MULTI): Status: ACTIVE | Noted: 2023-04-04

## 2025-08-07 ENCOUNTER — HOME VISIT (OUTPATIENT)
Dept: POST ACUTE CARE | Facility: EXTERNAL LOCATION | Age: 75
End: 2025-08-07
Payer: MEDICARE

## 2025-08-07 DIAGNOSIS — F32.A ANXIETY AND DEPRESSION: ICD-10-CM

## 2025-08-07 DIAGNOSIS — F32.4 MAJOR DEPRESSIVE DISORDER IN PARTIAL REMISSION, UNSPECIFIED WHETHER RECURRENT: Primary | ICD-10-CM

## 2025-08-07 DIAGNOSIS — F03.90 ADVANCING DEMENTIA (MULTI): ICD-10-CM

## 2025-08-07 DIAGNOSIS — F03.918 AGGRESSIVE BEHAVIOR DUE TO DEMENTIA: ICD-10-CM

## 2025-08-07 DIAGNOSIS — F41.9 ANXIETY AND DEPRESSION: ICD-10-CM

## 2025-08-07 PROCEDURE — 99348 HOME/RES VST EST LOW MDM 30: CPT | Performed by: EMERGENCY MEDICINE
